# Patient Record
Sex: FEMALE | Race: BLACK OR AFRICAN AMERICAN | Employment: UNEMPLOYED | URBAN - METROPOLITAN AREA
[De-identification: names, ages, dates, MRNs, and addresses within clinical notes are randomized per-mention and may not be internally consistent; named-entity substitution may affect disease eponyms.]

---

## 2020-07-03 PROBLEM — Z34.90 PREGNANCY: Status: ACTIVE | Noted: 2020-07-03

## 2022-03-19 PROBLEM — Z34.90 PREGNANCY: Status: ACTIVE | Noted: 2020-07-03

## 2022-08-05 LAB
ANTIBODY SCREEN, EXTERNAL: NORMAL
CHLAMYDIA, EXTERNAL: NORMAL
HBSAG, EXTERNAL: NORMAL
HIV, EXTERNAL: NORMAL
N. GONORRHEA, EXTERNAL: NORMAL
RPR, EXTERNAL: NORMAL
RUBELLA, EXTERNAL: NORMAL

## 2022-12-14 ENCOUNTER — HOSPITAL ENCOUNTER (INPATIENT)
Age: 22
LOS: 1 days | Discharge: HOME | End: 2022-12-15
Attending: OBSTETRICS & GYNECOLOGY | Admitting: STUDENT IN AN ORGANIZED HEALTH CARE EDUCATION/TRAINING PROGRAM
Payer: MEDICAID

## 2022-12-14 PROBLEM — O34.29 MATERNAL CARE DUE TO UTERINE SCAR FROM OTHER PREVIOUS SURGERY: Status: ACTIVE | Noted: 2022-12-14

## 2022-12-14 PROBLEM — O47.03 PRETERM UTERINE CONTRACTIONS IN THIRD TRIMESTER, ANTEPARTUM: Status: ACTIVE | Noted: 2022-12-14

## 2022-12-14 PROBLEM — Z3A.33 33 WEEKS GESTATION OF PREGNANCY: Status: ACTIVE | Noted: 2022-12-14

## 2022-12-14 LAB
APPEARANCE UR: CLEAR
BACTERIA URNS QL MICRO: NEGATIVE /HPF
BASOPHILS # BLD: 0 K/UL (ref 0–0.1)
BASOPHILS NFR BLD: 0 % (ref 0–2)
BILIRUB UR QL: NEGATIVE
COLOR UR: YELLOW
DIFFERENTIAL METHOD BLD: ABNORMAL
EOSINOPHIL # BLD: 0 K/UL (ref 0–0.4)
EOSINOPHIL NFR BLD: 0 % (ref 0–5)
EPITH CASTS URNS QL MICRO: NORMAL /LPF (ref 0–5)
ERYTHROCYTE [DISTWIDTH] IN BLOOD BY AUTOMATED COUNT: 13.3 % (ref 11.6–14.5)
GLUCOSE UR STRIP.AUTO-MCNC: NEGATIVE MG/DL
HCT VFR BLD AUTO: 24.8 % (ref 35–45)
HGB BLD-MCNC: 8.4 G/DL (ref 12–16)
HGB UR QL STRIP: NEGATIVE
IMM GRANULOCYTES # BLD AUTO: 0.1 K/UL (ref 0–0.04)
IMM GRANULOCYTES NFR BLD AUTO: 1 % (ref 0–0.5)
KETONES UR QL STRIP.AUTO: 40 MG/DL
LEUKOCYTE ESTERASE UR QL STRIP.AUTO: ABNORMAL
LYMPHOCYTES # BLD: 2 K/UL (ref 0.9–3.6)
LYMPHOCYTES NFR BLD: 17 % (ref 21–52)
MCH RBC QN AUTO: 30.3 PG (ref 24–34)
MCHC RBC AUTO-ENTMCNC: 33.9 G/DL (ref 31–37)
MCV RBC AUTO: 89.5 FL (ref 78–100)
MONOCYTES # BLD: 1 K/UL (ref 0.05–1.2)
MONOCYTES NFR BLD: 8 % (ref 3–10)
NEUTS SEG # BLD: 8.9 K/UL (ref 1.8–8)
NEUTS SEG NFR BLD: 74 % (ref 40–73)
NITRITE UR QL STRIP.AUTO: NEGATIVE
NRBC # BLD: 0 K/UL (ref 0–0.01)
NRBC BLD-RTO: 0 PER 100 WBC
PH UR STRIP: 7.5 [PH] (ref 5–8)
PLATELET # BLD AUTO: 255 K/UL (ref 135–420)
PMV BLD AUTO: 9.2 FL (ref 9.2–11.8)
PROT UR STRIP-MCNC: NEGATIVE MG/DL
RBC # BLD AUTO: 2.77 M/UL (ref 4.2–5.3)
RBC #/AREA URNS HPF: NEGATIVE /HPF (ref 0–5)
SP GR UR REFRACTOMETRY: 1.01 (ref 1–1.03)
UROBILINOGEN UR QL STRIP.AUTO: 0.2 EU/DL (ref 0.2–1)
WBC # BLD AUTO: 12 K/UL (ref 4.6–13.2)
WBC URNS QL MICRO: NORMAL /HPF (ref 0–5)

## 2022-12-14 PROCEDURE — 85025 COMPLETE CBC W/AUTO DIFF WBC: CPT

## 2022-12-14 PROCEDURE — 75410000002 HC LABOR FEE PER 1 HR

## 2022-12-14 PROCEDURE — 74011250636 HC RX REV CODE- 250/636

## 2022-12-14 PROCEDURE — 86900 BLOOD TYPING SEROLOGIC ABO: CPT

## 2022-12-14 PROCEDURE — 86901 BLOOD TYPING SEROLOGIC RH(D): CPT

## 2022-12-14 PROCEDURE — 74011250636 HC RX REV CODE- 250/636: Performed by: ADVANCED PRACTICE MIDWIFE

## 2022-12-14 PROCEDURE — 87147 CULTURE TYPE IMMUNOLOGIC: CPT

## 2022-12-14 PROCEDURE — 81001 URINALYSIS AUTO W/SCOPE: CPT

## 2022-12-14 PROCEDURE — 74011250637 HC RX REV CODE- 250/637

## 2022-12-14 PROCEDURE — 65270000029 HC RM PRIVATE

## 2022-12-14 PROCEDURE — 87081 CULTURE SCREEN ONLY: CPT

## 2022-12-14 PROCEDURE — 86850 RBC ANTIBODY SCREEN: CPT

## 2022-12-14 PROCEDURE — 87491 CHLMYD TRACH DNA AMP PROBE: CPT

## 2022-12-14 PROCEDURE — 4A1HXCZ MONITORING OF PRODUCTS OF CONCEPTION, CARDIAC RATE, EXTERNAL APPROACH: ICD-10-PCS | Performed by: STUDENT IN AN ORGANIZED HEALTH CARE EDUCATION/TRAINING PROGRAM

## 2022-12-14 RX ORDER — NIFEDIPINE 10 MG/1
10 CAPSULE ORAL EVERY 4 HOURS
Status: DISCONTINUED | OUTPATIENT
Start: 2022-12-15 | End: 2022-12-15 | Stop reason: HOSPADM

## 2022-12-14 RX ORDER — ACETAMINOPHEN 500 MG
1000 TABLET ORAL
Status: DISCONTINUED | OUTPATIENT
Start: 2022-12-14 | End: 2022-12-15 | Stop reason: HOSPADM

## 2022-12-14 RX ORDER — LANOLIN ALCOHOL/MO/W.PET/CERES
CREAM (GRAM) TOPICAL
COMMUNITY

## 2022-12-14 RX ORDER — NIFEDIPINE 10 MG/1
10 CAPSULE ORAL EVERY 4 HOURS
Status: DISCONTINUED | OUTPATIENT
Start: 2022-12-15 | End: 2022-12-14

## 2022-12-14 RX ORDER — BETAMETHASONE SODIUM PHOSPHATE AND BETAMETHASONE ACETATE 3; 3 MG/ML; MG/ML
12 INJECTION, SUSPENSION INTRA-ARTICULAR; INTRALESIONAL; INTRAMUSCULAR; SOFT TISSUE EVERY 24 HOURS
Status: COMPLETED | OUTPATIENT
Start: 2022-12-14 | End: 2022-12-15

## 2022-12-14 RX ORDER — NIFEDIPINE 10 MG/1
30 CAPSULE ORAL ONCE
Status: COMPLETED | OUTPATIENT
Start: 2022-12-14 | End: 2022-12-14

## 2022-12-14 RX ORDER — ONDANSETRON 2 MG/ML
4 INJECTION INTRAMUSCULAR; INTRAVENOUS
Status: DISCONTINUED | OUTPATIENT
Start: 2022-12-14 | End: 2022-12-15 | Stop reason: HOSPADM

## 2022-12-14 RX ORDER — SODIUM CHLORIDE 0.9 % (FLUSH) 0.9 %
5-40 SYRINGE (ML) INJECTION EVERY 8 HOURS
Status: DISCONTINUED | OUTPATIENT
Start: 2022-12-14 | End: 2022-12-15 | Stop reason: HOSPADM

## 2022-12-14 RX ORDER — OXYTOCIN/RINGER'S LACTATE 30/500 ML
10 PLASTIC BAG, INJECTION (ML) INTRAVENOUS AS NEEDED
Status: DISCONTINUED | OUTPATIENT
Start: 2022-12-14 | End: 2022-12-15 | Stop reason: HOSPADM

## 2022-12-14 RX ORDER — SODIUM CHLORIDE 0.9 % (FLUSH) 0.9 %
5-40 SYRINGE (ML) INJECTION AS NEEDED
Status: DISCONTINUED | OUTPATIENT
Start: 2022-12-14 | End: 2022-12-15 | Stop reason: HOSPADM

## 2022-12-14 RX ORDER — SODIUM CHLORIDE, SODIUM LACTATE, POTASSIUM CHLORIDE, CALCIUM CHLORIDE 600; 310; 30; 20 MG/100ML; MG/100ML; MG/100ML; MG/100ML
125 INJECTION, SOLUTION INTRAVENOUS CONTINUOUS
Status: DISCONTINUED | OUTPATIENT
Start: 2022-12-14 | End: 2022-12-15 | Stop reason: HOSPADM

## 2022-12-14 RX ORDER — OXYTOCIN/0.9 % SODIUM CHLORIDE 30/500 ML
87.3 PLASTIC BAG, INJECTION (ML) INTRAVENOUS AS NEEDED
Status: DISCONTINUED | OUTPATIENT
Start: 2022-12-14 | End: 2022-12-15 | Stop reason: HOSPADM

## 2022-12-14 RX ORDER — BUTORPHANOL TARTRATE 2 MG/ML
2 INJECTION INTRAMUSCULAR; INTRAVENOUS
Status: DISCONTINUED | OUTPATIENT
Start: 2022-12-14 | End: 2022-12-15 | Stop reason: HOSPADM

## 2022-12-14 RX ADMIN — NIFEDIPINE 30 MG: 10 CAPSULE ORAL at 20:49

## 2022-12-14 RX ADMIN — SODIUM CHLORIDE, POTASSIUM CHLORIDE, SODIUM LACTATE AND CALCIUM CHLORIDE 125 ML/HR: 600; 310; 30; 20 INJECTION, SOLUTION INTRAVENOUS at 22:18

## 2022-12-14 RX ADMIN — ACETAMINOPHEN 1000 MG: 500 TABLET ORAL at 22:00

## 2022-12-14 RX ADMIN — BETAMETHASONE SODIUM PHOSPHATE AND BETAMETHASONE ACETATE 12 MG: 3; 3 INJECTION, SUSPENSION INTRA-ARTICULAR; INTRALESIONAL; INTRAMUSCULAR at 19:38

## 2022-12-15 VITALS
BODY MASS INDEX: 29.02 KG/M2 | DIASTOLIC BLOOD PRESSURE: 79 MMHG | WEIGHT: 170 LBS | RESPIRATION RATE: 20 BRPM | OXYGEN SATURATION: 100 % | HEART RATE: 114 BPM | TEMPERATURE: 98.4 F | HEIGHT: 64 IN | SYSTOLIC BLOOD PRESSURE: 128 MMHG

## 2022-12-15 LAB
C TRACH RRNA SPEC QL NAA+PROBE: NEGATIVE
N GONORRHOEA RRNA SPEC QL NAA+PROBE: NEGATIVE
SPECIMEN SOURCE: NORMAL

## 2022-12-15 PROCEDURE — 99284 EMERGENCY DEPT VISIT MOD MDM: CPT

## 2022-12-15 PROCEDURE — 74011250636 HC RX REV CODE- 250/636

## 2022-12-15 PROCEDURE — 74011250636 HC RX REV CODE- 250/636: Performed by: ADVANCED PRACTICE MIDWIFE

## 2022-12-15 PROCEDURE — 96360 HYDRATION IV INFUSION INIT: CPT

## 2022-12-15 PROCEDURE — 96372 THER/PROPH/DIAG INJ SC/IM: CPT

## 2022-12-15 PROCEDURE — 74011250637 HC RX REV CODE- 250/637

## 2022-12-15 PROCEDURE — 59020 FETAL CONTRACT STRESS TEST: CPT

## 2022-12-15 RX ORDER — DIPHENHYDRAMINE HCL 25 MG
25 CAPSULE ORAL
Status: DISCONTINUED | OUTPATIENT
Start: 2022-12-15 | End: 2022-12-15 | Stop reason: HOSPADM

## 2022-12-15 RX ORDER — NIFEDIPINE 10 MG/1
10 CAPSULE ORAL EVERY 4 HOURS
Qty: 6 CAPSULE | Refills: 0 | Status: SHIPPED | OUTPATIENT
Start: 2022-12-15 | End: 2022-12-17

## 2022-12-15 RX ORDER — ZOLPIDEM TARTRATE 5 MG/1
5 TABLET ORAL
Status: DISCONTINUED | OUTPATIENT
Start: 2022-12-15 | End: 2022-12-15 | Stop reason: HOSPADM

## 2022-12-15 RX ADMIN — SODIUM CHLORIDE, POTASSIUM CHLORIDE, SODIUM LACTATE AND CALCIUM CHLORIDE 125 ML/HR: 600; 310; 30; 20 INJECTION, SOLUTION INTRAVENOUS at 13:35

## 2022-12-15 RX ADMIN — SODIUM CHLORIDE, POTASSIUM CHLORIDE, SODIUM LACTATE AND CALCIUM CHLORIDE 125 ML/HR: 600; 310; 30; 20 INJECTION, SOLUTION INTRAVENOUS at 05:32

## 2022-12-15 RX ADMIN — NIFEDIPINE 10 MG: 10 CAPSULE ORAL at 12:49

## 2022-12-15 RX ADMIN — NIFEDIPINE 10 MG: 10 CAPSULE ORAL at 05:03

## 2022-12-15 RX ADMIN — NIFEDIPINE 10 MG: 10 CAPSULE ORAL at 16:56

## 2022-12-15 RX ADMIN — BETAMETHASONE SODIUM PHOSPHATE AND BETAMETHASONE ACETATE 12 MG: 3; 3 INJECTION, SUSPENSION INTRA-ARTICULAR; INTRALESIONAL; INTRAMUSCULAR at 19:44

## 2022-12-15 RX ADMIN — ZOLPIDEM TARTRATE 5 MG: 5 TABLET ORAL at 02:17

## 2022-12-15 RX ADMIN — HUMAN RHO(D) IMMUNE GLOBULIN 0.3 MG: 300 INJECTION, SOLUTION INTRAMUSCULAR at 14:51

## 2022-12-15 RX ADMIN — NIFEDIPINE 10 MG: 10 CAPSULE ORAL at 01:02

## 2022-12-15 RX ADMIN — NIFEDIPINE 10 MG: 10 CAPSULE ORAL at 09:06

## 2022-12-15 NOTE — PROGRESS NOTES
Problem: Pain  Goal: *Control of Pain  Outcome: Progressing Towards Goal     Problem: Patient Education: Go to Patient Education Activity  Goal: Patient/Family Education  Outcome: Progressing Towards Goal     Problem:  Labor  Goal: *Prevention/Cessation of labor signs/symptoms  Outcome: Progressing Towards Goal  Goal: *Reassuring fetal surveillance  Outcome: Progressing Towards Goal     Problem: Patient Education: Go to Patient Education Activity  Goal: Patient/Family Education  Outcome: Progressing Towards Goal

## 2022-12-15 NOTE — PROGRESS NOTES
1915- Care of pt assumed by this RN    2017- CNM at bedside discussing TOLAC vs. c/s delivery w/ pt    2019- SVE by MILADY France CNM: no change    2020- IV i'locked per CNM. Plan to continue to observe and give procardia per CNM. 2157- EFM tracing & maternal tachycardia reviewed by Dr. Madhuri Hendrickson. JONNIE France; plan to restart IV fluids. 46- MILADY Wolf CNM updated on pt status & VS including maternal tachycardia. No new orders at this time. RN still to give scheduled dose of procardia per CNM.      3224- EFM tracing reviewed by JONNIE    0671- Shift change report given to Sugey Jackson RN assuming care of pt.

## 2022-12-15 NOTE — H&P
History & Physical    Name: Kristina Zapata MRN: [id number]  SSN: [id number]    YOB: 2000  Age: 25 y.o. Sex: female        Subjective:     Estimated Date of Delivery: 23  OB History    Para Term  AB Living   3 2 2     2   SAB IAB Ectopic Molar Multiple Live Births           0 2      # Outcome Date GA Lbr Ulices/2nd Weight Sex Delivery Anes PTL Lv   3 Current            2 Term 20 40w2d  3.56 kg M CS-LTranv Spinal N TARA   1 Term     F CS-LTranv   TARA       Ms. Ann Garcia is admitted with pregnancy at 33w0d for Increasingly painful contractions. Patient presented to [facility], [place], operated by [facility] ED this afternoon with reports of abdominal pain, contractions and discharge since Monday that increased in intensity today. Was transferred to [facility] via EMS for suspected  labor. Prenatal course was complicated by  prior  section x 2 and anemia  . Please see prenatal records for details. Past Medical History:   Diagnosis Date    Anemia      Past Surgical History:   Procedure Laterality Date    HX  SECTION       Social History     Occupational History    Not on file   Tobacco Use    Smoking status: Never    Smokeless tobacco: Never   Substance and Sexual Activity    Alcohol use: No    Drug use: No    Sexual activity: Not on file     No family history on file. No Known Allergies  Prior to Admission medications    Medication Sig Start Date End Date Taking? Authorizing Provider   ibuprofen (MOTRIN) 600 mg tablet Take 1 Tab by mouth every six (6) hours as needed for Pain. 20   Taryn Stearns MD   PNV No12-Iron-FA-DSS-OM-3 29 mg iron-1 mg -50 mg CPKD Take 1 Tab by mouth. Provider, Historical        Review of Systems: A comprehensive review of systems was negative except for that written in the HPI.     Objective:     Vitals:  Vitals:    22 1907   BP: 124/78   Pulse: 98   Resp: 16   Temp: 98.7 °F (37.1 °C)        Physical Exam:  Perineum: blood present, amniotic fluid absent  Cervical Exam: 4 cm dilated    80% effaced    -2 station    Presenting Part: cephalic  Cervical Position: mid position  Membranes:  Intact  Fetal Heart Rate: Baseline: 140 per minute  Variability: moderate  Accelerations: yes  Decelerations: none  Uterine contractions: regular, every 3-4 minutes    Prenatal Labs:   Lab Results   Component Value Date/Time    ABO/Rh(D) B Rh Negative 2020 02:33 PM    Rubella, External immune 2022 12:00 AM    GrBStrep, External negative 2020 12:00 AM    HBsAg, External neg 2022 12:00 AM    HIV, External neg 2022 12:00 AM    RPR, External NR 2022 12:00 AM    Gonorrhea, External neg 2022 12:00 AM    Chlamydia, External neg 2022 12:00 AM    ABO,Rh B+ 2019 12:00 AM         Assessment/Plan:     Active Problems:    33 weeks gestation of pregnancy (2022)      Maternal care due to uterine scar from other previous surgery (2022)       uterine contractions in third trimester, antepartum (2022)         Plan: Admit for  contractions at 33 weeks with hx of prior  section x2. Plan for ANCS x2 doses, IV hydration and reassess cervical dilation in 1 hour. Dr. Annalisa Hong aware of admission and POC. .  Group B Strep was tested on admission. Jessica Tejada

## 2022-12-15 NOTE — PROGRESS NOTES
Ante Partum High Risk Pregnancy Note      Patient: George Uribe               Sex: female          DOA: [unfilled]       YOB: 2000      Age:  25 y.o.        LOS:  LOS: 1 day     Patient admitted for   contractions  states she does have normal fetal movement and does not  have  right upper quadrant pain  , vaginal bleeding , and vaginal leaking of fluid . She does report occasional uterine cramping. LOS:  1  Vitals: Temp (24hrs), Av.6 °F (37 °C), Min:97.8 °F (36.6 °C), Max:99.4 °F (37.4 °C)   Patient Vitals for the past 24 hrs:   BP   12/15/22 1455 124/70   12/15/22 1432 117/66   12/15/22 1300 118/81   12/15/22 1249 126/77   12/15/22 1200 126/77   12/15/22 1101 123/77   12/15/22 1000 114/69   12/15/22 0906 103/75   12/15/22 0800 116/82   12/15/22 0726 118/86   12/15/22 0724 118/86   12/15/22 0700 116/67   12/15/22 0600 (!) 105/58   12/15/22 0500 106/67   12/15/22 0445 113/72   12/15/22 0400 (!) 93/58   12/15/22 0300 114/70   12/15/22 0252 110/61   12/15/22 0100 118/68   12/15/22 0030 123/75   12/15/22 0000 120/81   22 2330 118/78   22 2300 112/63   22 2230 (!) 103/55   22 2200 (!) 106/51   22 2130 114/62   22 2100 135/78   22 2031 138/80   22 127/75   22 126/73   22 1907 124/78       I&O:   No intake/output data recorded. No intake/output data recorded. Exam:  Patient without distress. Abdomen: soft, non-tender               Fundus: soft and non tender               Fetal Monitoring:  Baseline: 150 bpm, Variability: moderate, Accelerations: +, and Decelerations: Absent   Uterine Activity: irregular contractions lasting 50-80 seconds    Dilation: deferred       Labs: All lab results for the last 24 hours reviewed. Assessment and Plan:       Active Problems:    33 weeks gestation of pregnancy (2022)      Maternal care due to uterine scar from other previous surgery (2022)  uterine contractions in third trimester, antepartum (2022)    Procardia 10mg q 4 hours for 48 hours   corticosteroids x2 doses  Rhogam given for RH negative    Plan for discharge home this evening after her 2nd dose of steroids; will DC with 6 doses of procardia to complete the 48 hours needed to complete the course of steroids. Patient to return with worsening contractions, ROM, bleeding. Patient to keep scheduled [facility] visit. Discussed plan with Dr. Jessica Lyles.     Kedar Patel CNM

## 2022-12-15 NOTE — PROGRESS NOTES
Problem: Pain  Goal: *Control of Pain  Outcome: Progressing Towards Goal     Problem: Patient Education: Go to Patient Education Activity  Goal: Patient/Family Education  Outcome: Progressing Towards Goal     Problem:  Labor  Goal: *Prevention/Cessation of labor signs/symptoms  Outcome: Progressing Towards Goal  Goal: *Reassuring fetal surveillance  Outcome: Progressing Towards Goal     Problem:  Labor  Goal: *Reassuring fetal surveillance  Outcome: Progressing Towards Goal     Problem: Patient Education: Go to Patient Education Activity  Goal: Patient/Family Education  Outcome: Progressing Towards Goal

## 2022-12-15 NOTE — DISCHARGE SUMMARY
Obstetrical Discharge Summary     Name: Hever Enciso MRN: [id number]  SSN: [id number]    YOB: 2000  Age: 25 y.o. Sex: female      Admit Date: 2022    Discharge Date: 12/15/2022     Admitting Physician: Dwight Rascon MD     Attending Physician:  Matilde Borden MD     Admission Diagnoses: Maternal care due to uterine scar from other previous surgery [O34.29]   uterine contractions in third trimester, antepartum [O47.03]  33 weeks gestation of pregnancy [Z3A.33]    Discharge Diagnoses:    uterine contractions in third trimester, antepartum [O47.03]    Additional Diagnoses:   Hospital Problems  Date Reviewed: 9/15/2014            Codes Class Noted POA    33 weeks gestation of pregnancy ICD-10-CM: Z3A.33  ICD-9-CM: V22.2  2022 Unknown        Maternal care due to uterine scar from other previous surgery ICD-10-CM: O34.29  ICD-9-CM: 654.90  2022 Unknown         uterine contractions in third trimester, antepartum ICD-10-CM: O47.03  ICD-9-CM: 644.03  2022 Unknown          Lab Results   Component Value Date/Time    Rubella, External immune 2022 12:00 AM    GrBStrep, External negative 2020 12:00 AM       Immunization(s):   Immunization History   Administered Date(s) Administered    Rho(D) Immune Globulin - IM 2020, 12/15/2022        Hospital Course: uncomplicated    Patient Instructions:   Current Discharge Medication List        START taking these medications    Details   NIFEdipine (PROCARDIA) 10 mg capsule Take 1 Capsule by mouth every four (4) hours for 7 doses. Qty: 6 Capsule, Refills: 0           CONTINUE these medications which have NOT CHANGED    Details   ferrous sulfate (Iron) 325 mg (65 mg iron) tablet Take  by mouth Daily (before breakfast). PNV No12-Iron-FA-DSS-OM-3 29 mg iron-1 mg -50 mg CPKD Take 1 Tab by mouth.            STOP taking these medications       ibuprofen (MOTRIN) 600 mg tablet Comments:   Reason for Stopping: Reference my discharge instructions. Follow-up Appointments   Procedures    FOLLOW UP VISIT Appointment in: Other (Specify) Keep scheduled appt tomorrow. Keep scheduled appt tomorrow. Standing Status:   Standing     Number of Occurrences:   1     Order Specific Question:   Appointment in     Answer:    Other (Specify)        Signed By:  Meir Fuentse CNM     December 15, 2022

## 2022-12-15 NOTE — PROGRESS NOTES
0715-Bedside and Verbal shift change report given to Kojo Delong RN and KELLIE Casarez   (oncoming nurse) by Miles Lagunas RN (offgoing nurse). Report included the following information SBAR, Procedure Summary, Intake/Output, MAR, and Recent Results. 3133- verbally reported to MILADY France CNM  to patient's MEWS score of 1, due to tachycardia.

## 2022-12-15 NOTE — PROGRESS NOTES
33 weeks gestation sent from [facility] via EMS for rule out labor with cervical exam 4 and high per PA. Pt taken to L&D room 7, oriented to room, placed on FHM, call bell in reach. SVE /ballotable by ROBBIE Daigle CNM.  Bedside and Verbal shift change report given to Eddie Rincon RN (oncoming nurse) by Nasreen Arita RN   (offgoing nurse). Report included the following information SBAR, Intake/Output, MAR, Recent Results, and Med Rec Status.

## 2022-12-15 NOTE — PROGRESS NOTES
Ante Partum Progress Note    Stacy Modi  33w0d    Assessment: 33w0d   Pre-term labor, arrested  /-2; Cephalic presentation  Membranes intact     Plan:  Continue hospitalization with hospitalized bedrest, Complete  steroid course, and Continued tocolysis with nifedipine. Discussed plan for possible TOLAC vs Rpt CS if actively laboring, pt to discuss with partner and advise. Reviewed risks/benefits of TOLAC vs CS. Patient states she does have normal fetal movement and does not have headache , right upper quadrant pain  , vaginal bleeding , swelling, and vaginal leaking of fluid ; Denies contractions being any more uncomfortable than they have been. Vitals:  Visit Vitals  /75 (BP 1 Location: Right upper arm, BP Patient Position: Sitting)   Pulse 89   Temp 98.6 °F (37 °C)   Resp 20   Ht 5' 4\" (1.626 m)   Wt 77.1 kg (170 lb)   SpO2 100%   BMI 29.18 kg/m²     Temp (24hrs), Av.7 °F (37.1 °C), Min:98.6 °F (37 °C), Max:98.7 °F (37.1 °C)      Last 24hr Input/Output:  No intake or output data in the 24 hours ending 22     Non stress test:  Reactive    Uterine Activity: Frequency: Every 3-7 minutes; mild on palpation    FHR:  148 baseline, moderate variability, + accels, no decels  Cat I FHR tracing     Exam:  Patient without distress.      Abdomen, fundus soft non-tender     Extremities, no redness or tenderness             CERVIX;  /-9 / Cephalic on exam     Additional Exam: Deferred    Labs:     Lab Results   Component Value Date/Time    WBC 12.0 2022 07:37 PM    WBC 17.6 (H) 2020 02:33 PM    WBC 11.7 (H) 2020 05:24 AM    HGB 8.4 (L) 2022 07:37 PM    HGB 10.5 (L) 2020 02:33 PM    HGB 8.8 (L) 2020 05:24 AM    HCT 24.8 (L) 2022 07:37 PM    HCT 31.9 (L) 2020 02:33 PM    HCT 27.2 (L) 2020 05:24 AM    PLATELET 311  07:37 PM    PLATELET 985  02:33 PM    PLATELET 532  05:24 AM       Recent Results (from the past 24 hour(s))   CBC WITH AUTOMATED DIFF    Collection Time: 12/14/22  7:37 PM   Result Value Ref Range    WBC 12.0 4.6 - 13.2 K/uL    RBC 2.77 (L) 4.20 - 5.30 M/uL    HGB 8.4 (L) 12.0 - 16.0 g/dL    HCT 24.8 (L) 35.0 - 45.0 %    MCV 89.5 78.0 - 100.0 FL    MCH 30.3 24.0 - 34.0 PG    MCHC 33.9 31.0 - 37.0 g/dL    RDW 13.3 11.6 - 14.5 %    PLATELET 863 165 - 755 K/uL    MPV 9.2 9.2 - 11.8 FL    NRBC 0.0 0  WBC    ABSOLUTE NRBC 0.00 0.00 - 0.01 K/uL    NEUTROPHILS 74 (H) 40 - 73 %    LYMPHOCYTES 17 (L) 21 - 52 %    MONOCYTES 8 3 - 10 %    EOSINOPHILS 0 0 - 5 %    BASOPHILS 0 0 - 2 %    IMMATURE GRANULOCYTES 1 (H) 0.0 - 0.5 %    ABS. NEUTROPHILS 8.9 (H) 1.8 - 8.0 K/UL    ABS. LYMPHOCYTES 2.0 0.9 - 3.6 K/UL    ABS. MONOCYTES 1.0 0.05 - 1.2 K/UL    ABS. EOSINOPHILS 0.0 0.0 - 0.4 K/UL    ABS. BASOPHILS 0.0 0.0 - 0.1 K/UL    ABS. IMM.  GRANS. 0.1 (H) 0.00 - 0.04 K/UL    DF AUTOMATED     TYPE & SCREEN    Collection Time: 12/14/22  7:37 PM   Result Value Ref Range    Crossmatch Expiration 12/17/2022,2359     ABO/Rh(D) PENDING     Antibody screen PENDING        Sánchez Rodriguez CNM

## 2022-12-16 LAB
ABO + RH BLD: NORMAL
BACTERIA SPEC CULT: ABNORMAL
BLD PROD TYP BPU: NORMAL
BLOOD BANK CMNT PATIENT-IMP: NORMAL
BLOOD GROUP ANTIBODIES SERPL: NORMAL
BPU ID: NORMAL
CALLED TO:,BCALL1: NORMAL
CALLED TO:,BCALL1: NORMAL
GA (WEEKS): NORMAL WK
SERVICE CMNT-IMP: ABNORMAL
SPECIMEN EXP DATE BLD: NORMAL
STATUS OF UNIT,%ST: NORMAL
UNIT DIVISION, %UDIV: 0

## 2022-12-16 NOTE — PROGRESS NOTES
1944 2nd dose of Celestone given. Written and verbal DC instructions given to include daily fetal kick counts,  labor precautions. Verbalizes understanding. All questions answered. Up to get dressed.  DC'd to home ambulatory with significant other.

## 2022-12-16 NOTE — DISCHARGE INSTRUCTIONS
Counting Your Baby's Kicks: Care Instructions  Overview     Counting your baby's kicks is one way your doctor can tell that your baby is healthy. Most women--especially in a first pregnancy--feel their baby move for the first time between 16 and 22 weeks. The movement may feel like flutters rather than kicks. Your baby may move more at certain times of the day. When you are active, you may notice less kicking than when you are resting. At your prenatal visits, your doctor will ask whether the baby is active. In your last trimester, your doctor may ask you to count the number of times you feel your baby move. Follow-up care is a key part of your treatment and safety. Be sure to make and go to all appointments, and call your doctor if you are having problems. It's also a good idea to know your test results and keep a list of the medicines you take. How do you count fetal kicks? A common method of checking your baby's movement is to note the length of time it takes to count ten movements (such as kicks, flutters, or rolls). Pick your baby's most active time of day to count. This may be any time from morning to evening. If you don't feel 10 movements in an hour, have something to eat or drink and count for another hour. If you don't feel at least 10 movements in the 2-hour period, call your doctor. When should you call for help? Call your doctor now or seek immediate medical care if:    You noticed that your baby has stopped moving or is moving much less than normal.   Watch closely for changes in your health, and be sure to contact your doctor if you have any problems. Where can you learn more? Go to http://www.gray.com/  Enter [id number] in the search box to learn more about \"Counting Your Baby's Kicks: Care Instructions. \"  Current as of: February 23, 2022               Content Version: 13.4  © 6597-7484 Healthwise, [employer].    Care instructions adapted under license by [employer] (which disclaims liability or warranty for this information). If you have questions about a medical condition or this instruction, always ask your healthcare professional. [place] [address] any warranty or liability for your use of this information.  Labor: Care Instructions  Overview      labor is the start of labor between 21 and 36 weeks of pregnancy. Most babies are born at 40 to 41 weeks of pregnancy. In labor, the uterus contracts to open the cervix. This is the first stage of childbirth.  labor can be caused by a problem with the baby, the mother, or both. Often the cause is not known. In some cases, doctors use medicines to try to delay labor until 29 or more weeks of pregnancy. By this time, a baby has grown enough so that problems are not likely. In some cases--such as with a serious infection--it is healthier for the baby to be born early. Your treatment will depend on how far along you are in your pregnancy and on your health and your baby's health. Follow-up care is a key part of your treatment and safety. Be sure to make and go to all appointments, and call your doctor if you are having problems. It's also a good idea to know your test results and keep a list of the medicines you take. How can you care for yourself at home? If your doctor prescribed medicines, take them exactly as directed. Call your doctor if you think you are having a problem with your medicine. Rest until your doctor advises you about activity. Do not have sexual intercourse unless your doctor says it is safe. Use sanitary pads if you have vaginal bleeding. Using pads makes it easier to monitor your bleeding. Do not smoke or allow others to smoke around you. If you need help quitting, talk to your doctor about stop-smoking programs and medicines. These can increase your chances of quitting for good. When should you call for help?    Call 911  anytime you think you may need emergency care. For example, call if:    You passed out (lost consciousness). You have a seizure. You have severe vaginal bleeding. You have severe pain in your belly or pelvis that doesn't get better between contractions. You have had fluid gushing or leaking from your vagina and you know or think the umbilical cord is bulging into your vagina. If this happens, immediately get down on your knees so your rear end (buttocks) is higher than your head. This will decrease the pressure on the cord until help arrives. Call your doctor now or seek immediate medical care if:    You have signs of preeclampsia, such as:  Sudden swelling of your face, hands, or feet. New vision problems (such as dimness, blurring, or seeing spots). A severe headache. You have any vaginal bleeding. You have belly pain or cramping. You have a fever. You have had regular contractions (with or without pain) for an hour. This means that you have 6 or more within 1 hour after you change your position and drink fluids. You have a sudden release of fluid from the vagina. You have low back pain or pelvic pressure that does not go away. You notice that your baby has stopped moving or is moving much less than normal.   Watch closely for changes in your health, and be sure to contact your doctor if you have any problems. Where can you learn more? Go to http://www.gray.com/  Enter Q400 in the search box to learn more about \" Labor: Care Instructions. \"  Current as of: 2022               Content Version: 13.4   Healthwise, [employer]. Care instructions adapted under license by [employer] (which disclaims liability or warranty for this information).  If you have questions about a medical condition or this instruction, always ask your healthcare professional. [place] [address] any warranty or liability for your use of this information.

## 2022-12-16 NOTE — ROUTINE PROCESS
1910 Bedside and Verbal shift change report given to PERLA Greenberg Sa, RN  (oncoming nurse) by KELLIE Ye RN and Elle Loya RN  (offgoing nurse). Report included the following information SBAR, Kardex, Procedure Summary, Intake/Output, MAR, Accordion, Recent Results, and Med Rec Status.

## 2023-01-01 ENCOUNTER — HOSPITAL ENCOUNTER (EMERGENCY)
Age: 23
Discharge: HOME | End: 2023-01-02
Attending: OBSTETRICS & GYNECOLOGY | Admitting: OBSTETRICS & GYNECOLOGY
Payer: MEDICAID

## 2023-01-01 PROCEDURE — 59025 FETAL NON-STRESS TEST: CPT

## 2023-01-01 PROCEDURE — 65270000029 HC RM PRIVATE

## 2023-01-02 ENCOUNTER — HOSPITAL ENCOUNTER (INPATIENT)
Age: 23
LOS: 2 days | Discharge: HOME | End: 2023-01-05
Attending: OBSTETRICS & GYNECOLOGY | Admitting: OBSTETRICS & GYNECOLOGY
Payer: MEDICAID

## 2023-01-02 VITALS
BODY MASS INDEX: 29.02 KG/M2 | WEIGHT: 170 LBS | TEMPERATURE: 99.4 F | HEART RATE: 84 BPM | RESPIRATION RATE: 18 BRPM | DIASTOLIC BLOOD PRESSURE: 80 MMHG | OXYGEN SATURATION: 100 % | HEIGHT: 64 IN | SYSTOLIC BLOOD PRESSURE: 128 MMHG

## 2023-01-02 PROCEDURE — 74011250636 HC RX REV CODE- 250/636: Performed by: ADVANCED PRACTICE MIDWIFE

## 2023-01-02 PROCEDURE — 74011000250 HC RX REV CODE- 250: Performed by: ADVANCED PRACTICE MIDWIFE

## 2023-01-02 PROCEDURE — 81003 URINALYSIS AUTO W/O SCOPE: CPT

## 2023-01-02 PROCEDURE — 96360 HYDRATION IV INFUSION INIT: CPT

## 2023-01-02 PROCEDURE — 99285 EMERGENCY DEPT VISIT HI MDM: CPT

## 2023-01-02 PROCEDURE — 74011000258 HC RX REV CODE- 258: Performed by: ADVANCED PRACTICE MIDWIFE

## 2023-01-02 PROCEDURE — 59025 FETAL NON-STRESS TEST: CPT

## 2023-01-02 RX ORDER — ACETAMINOPHEN 500 MG
500 TABLET ORAL AS NEEDED
COMMUNITY
End: 2023-01-05

## 2023-01-02 RX ORDER — ONDANSETRON 2 MG/ML
4 INJECTION INTRAMUSCULAR; INTRAVENOUS ONCE
Status: COMPLETED | OUTPATIENT
Start: 2023-01-03 | End: 2023-01-03

## 2023-01-02 RX ADMIN — FAMOTIDINE 20 MG: 10 INJECTION, SOLUTION INTRAVENOUS at 23:56

## 2023-01-02 RX ADMIN — SODIUM CHLORIDE, POTASSIUM CHLORIDE, SODIUM LACTATE AND CALCIUM CHLORIDE 1000 ML: 600; 310; 30; 20 INJECTION, SOLUTION INTRAVENOUS at 01:00

## 2023-01-02 RX ADMIN — PROMETHAZINE HYDROCHLORIDE 12.5 MG: 25 INJECTION, SOLUTION INTRAMUSCULAR; INTRAVENOUS at 23:50

## 2023-01-02 RX ADMIN — SODIUM CHLORIDE, POTASSIUM CHLORIDE, SODIUM LACTATE AND CALCIUM CHLORIDE 1000 ML: 600; 310; 30; 20 INJECTION, SOLUTION INTRAVENOUS at 23:50

## 2023-01-02 NOTE — PROGRESS NOTES
2337- Pt brought to L&D unit c/o ctx. Pt is  and 35w4d. 2345- Pt placed in labor room 1 and oriented to room. Gown provided and Pt instructed to leave urine sample. 0000- Required documentation complete. 0015- SVE /-2.     0030- CNM Williams notified of Pt status and exam. Orders given to hydrate PO and give 1 liter of fluids. 0110- Pt resting. Water provided for oral hydration. 0134- Pt ambulated to restroom w/assistance. Pt stated \"contractions are not as close together but still intense. \"    0300- Pt advised of POC and agrees. Pt advised to follow up w/provider. 0330- I have reviewed discharge instructions with the patient. Instructions included signs of  labor and when to return as well as the importance of maintaining hydration. The patient verbalized understanding. 0331- Pt ambulated off of L&D unit.

## 2023-01-03 ENCOUNTER — ANESTHESIA (OUTPATIENT)
Dept: LABOR AND DELIVERY | Age: 23
End: 2023-01-03
Payer: MEDICAID

## 2023-01-03 ENCOUNTER — ANESTHESIA EVENT (OUTPATIENT)
Dept: LABOR AND DELIVERY | Age: 23
End: 2023-01-03
Payer: MEDICAID

## 2023-01-03 PROBLEM — O60.00 ACTIVE PRETERM LABOR: Status: ACTIVE | Noted: 2023-01-03

## 2023-01-03 PROBLEM — Z98.891 HISTORY OF CESAREAN DELIVERY: Status: ACTIVE | Noted: 2023-01-03

## 2023-01-03 PROBLEM — Z33.1 IUP (INTRAUTERINE PREGNANCY), INCIDENTAL: Status: ACTIVE | Noted: 2023-01-03

## 2023-01-03 PROBLEM — Z3A.35 35 WEEKS GESTATION OF PREGNANCY: Status: ACTIVE | Noted: 2023-01-03

## 2023-01-03 PROBLEM — O47.9 UTERINE CONTRACTIONS: Status: ACTIVE | Noted: 2023-01-03

## 2023-01-03 LAB
ABO + RH BLD: NORMAL
APPEARANCE UR: ABNORMAL
APPEARANCE UR: CLEAR
BASOPHILS # BLD: 0 K/UL (ref 0–0.1)
BASOPHILS NFR BLD: 0 % (ref 0–2)
BILIRUB UR QL: NEGATIVE
BILIRUB UR QL: NEGATIVE
BLOOD BANK CMNT PATIENT-IMP: NORMAL
BLOOD GROUP ANTIBODIES SERPL: NORMAL
BLOOD GROUP ANTIBODIES SERPL: NORMAL
CALLED TO:,BCALL1: NORMAL
CALLED TO:,BCALL2: NORMAL
COLOR UR: ABNORMAL
COLOR UR: YELLOW
DIFFERENTIAL METHOD BLD: ABNORMAL
EOSINOPHIL # BLD: 0 K/UL (ref 0–0.4)
EOSINOPHIL NFR BLD: 0 % (ref 0–5)
ERYTHROCYTE [DISTWIDTH] IN BLOOD BY AUTOMATED COUNT: 13.6 % (ref 11.6–14.5)
GLUCOSE UR QL STRIP.AUTO: NEGATIVE MG/DL
GLUCOSE UR QL STRIP.AUTO: NEGATIVE MG/DL
HCT VFR BLD AUTO: 24.1 % (ref 35–45)
HGB BLD-MCNC: 8.6 G/DL (ref 12–16)
IMM GRANULOCYTES # BLD AUTO: 0.2 K/UL (ref 0–0.04)
IMM GRANULOCYTES NFR BLD AUTO: 1 % (ref 0–0.5)
KETONES UR-MCNC: >160 MG/DL
KETONES UR-MCNC: NEGATIVE MG/DL
LEUKOCYTE ESTERASE UR QL STRIP: ABNORMAL
LEUKOCYTE ESTERASE UR QL STRIP: NEGATIVE
LYMPHOCYTES # BLD: 1.2 K/UL (ref 0.9–3.6)
LYMPHOCYTES NFR BLD: 6 % (ref 21–52)
MCH RBC QN AUTO: 32.5 PG (ref 24–34)
MCHC RBC AUTO-ENTMCNC: 35.7 G/DL (ref 31–37)
MCV RBC AUTO: 90.9 FL (ref 78–100)
MONOCYTES # BLD: 1.8 K/UL (ref 0.05–1.2)
MONOCYTES NFR BLD: 9 % (ref 3–10)
NEUTS SEG # BLD: 17 K/UL (ref 1.8–8)
NEUTS SEG NFR BLD: 84 % (ref 40–73)
NITRITE UR QL: NEGATIVE
NITRITE UR QL: NEGATIVE
NRBC # BLD: 0 K/UL (ref 0–0.01)
NRBC BLD-RTO: 0 PER 100 WBC
PH UR: 6.5 (ref 5–9)
PH UR: 7 (ref 5–9)
PLATELET # BLD AUTO: 195 K/UL (ref 135–420)
PMV BLD AUTO: 9 FL (ref 9.2–11.8)
PROT UR QL: 100 MG/DL
PROT UR QL: 30 MG/DL
RBC # BLD AUTO: 2.65 M/UL (ref 4.2–5.3)
RBC # UR STRIP: ABNORMAL
RBC # UR STRIP: ABNORMAL
RBC MORPH BLD: ABNORMAL
SERVICE CMNT-IMP: ABNORMAL
SERVICE CMNT-IMP: ABNORMAL
SP GR UR: >1.03 (ref 1–1.02)
SP GR UR: >1.03 (ref 1–1.02)
SPECIMEN EXP DATE BLD: NORMAL
UROBILINOGEN UR QL: 1 EU/DL (ref 0.2–1)
UROBILINOGEN UR QL: 1 EU/DL (ref 0.2–1)
WBC # BLD AUTO: 20.2 K/UL (ref 4.6–13.2)

## 2023-01-03 PROCEDURE — 74011250636 HC RX REV CODE- 250/636: Performed by: ADVANCED PRACTICE MIDWIFE

## 2023-01-03 PROCEDURE — 96361 HYDRATE IV INFUSION ADD-ON: CPT

## 2023-01-03 PROCEDURE — 96372 THER/PROPH/DIAG INJ SC/IM: CPT

## 2023-01-03 PROCEDURE — 00HU33Z INSERTION OF INFUSION DEVICE INTO SPINAL CANAL, PERCUTANEOUS APPROACH: ICD-10-PCS | Performed by: ANESTHESIOLOGY

## 2023-01-03 PROCEDURE — 74011250636 HC RX REV CODE- 250/636

## 2023-01-03 PROCEDURE — 96365 THER/PROPH/DIAG IV INF INIT: CPT

## 2023-01-03 PROCEDURE — 74011250637 HC RX REV CODE- 250/637

## 2023-01-03 PROCEDURE — 96360 HYDRATION IV INFUSION INIT: CPT

## 2023-01-03 PROCEDURE — 74011000258 HC RX REV CODE- 258

## 2023-01-03 PROCEDURE — 86900 BLOOD TYPING SEROLOGIC ABO: CPT

## 2023-01-03 PROCEDURE — 96374 THER/PROPH/DIAG INJ IV PUSH: CPT

## 2023-01-03 PROCEDURE — 86870 RBC ANTIBODY IDENTIFICATION: CPT

## 2023-01-03 PROCEDURE — 65270000029 HC RM PRIVATE

## 2023-01-03 PROCEDURE — 76060000078 HC EPIDURAL ANESTHESIA

## 2023-01-03 PROCEDURE — 85025 COMPLETE CBC W/AUTO DIFF WBC: CPT

## 2023-01-03 PROCEDURE — 74011250637 HC RX REV CODE- 250/637: Performed by: ADVANCED PRACTICE MIDWIFE

## 2023-01-03 PROCEDURE — 96366 THER/PROPH/DIAG IV INF ADDON: CPT

## 2023-01-03 PROCEDURE — 59025 FETAL NON-STRESS TEST: CPT

## 2023-01-03 PROCEDURE — G0378 HOSPITAL OBSERVATION PER HR: HCPCS

## 2023-01-03 PROCEDURE — 81003 URINALYSIS AUTO W/O SCOPE: CPT

## 2023-01-03 PROCEDURE — 99285 EMERGENCY DEPT VISIT HI MDM: CPT

## 2023-01-03 PROCEDURE — 75410000002 HC LABOR FEE PER 1 HR

## 2023-01-03 RX ORDER — MINERAL OIL
30 OIL (ML) ORAL AS NEEDED
Status: COMPLETED | OUTPATIENT
Start: 2023-01-03 | End: 2023-01-04

## 2023-01-03 RX ORDER — OXYTOCIN/0.9 % SODIUM CHLORIDE 30/500 ML
87.3 PLASTIC BAG, INJECTION (ML) INTRAVENOUS AS NEEDED
Status: DISCONTINUED | OUTPATIENT
Start: 2023-01-03 | End: 2023-01-04 | Stop reason: HOSPADM

## 2023-01-03 RX ORDER — SODIUM CHLORIDE, SODIUM LACTATE, POTASSIUM CHLORIDE, CALCIUM CHLORIDE 600; 310; 30; 20 MG/100ML; MG/100ML; MG/100ML; MG/100ML
125 INJECTION, SOLUTION INTRAVENOUS CONTINUOUS
Status: DISCONTINUED | OUTPATIENT
Start: 2023-01-03 | End: 2023-01-04 | Stop reason: HOSPADM

## 2023-01-03 RX ORDER — FENTANYL/ROPIVACAINE/NS/PF 2MCG/ML-.1
PLASTIC BAG, INJECTION (ML) EPIDURAL
Status: COMPLETED
Start: 2023-01-03 | End: 2023-01-03

## 2023-01-03 RX ORDER — NALBUPHINE HYDROCHLORIDE 10 MG/ML
10 INJECTION, SOLUTION INTRAMUSCULAR; INTRAVENOUS; SUBCUTANEOUS
Status: DISCONTINUED | OUTPATIENT
Start: 2023-01-03 | End: 2023-01-04 | Stop reason: HOSPADM

## 2023-01-03 RX ORDER — LIDOCAINE HYDROCHLORIDE 10 MG/ML
20 INJECTION, SOLUTION EPIDURAL; INFILTRATION; INTRACAUDAL; PERINEURAL AS NEEDED
Status: DISCONTINUED | OUTPATIENT
Start: 2023-01-03 | End: 2023-01-04 | Stop reason: HOSPADM

## 2023-01-03 RX ORDER — NIFEDIPINE 10 MG/1
30 CAPSULE ORAL ONCE
Status: COMPLETED | OUTPATIENT
Start: 2023-01-03 | End: 2023-01-03

## 2023-01-03 RX ORDER — NIFEDIPINE 30 MG/1
30 TABLET, EXTENDED RELEASE ORAL ONCE
Status: COMPLETED | OUTPATIENT
Start: 2023-01-03 | End: 2023-01-03

## 2023-01-03 RX ORDER — NIFEDIPINE 10 MG/1
10 CAPSULE ORAL EVERY 4 HOURS
Status: DISCONTINUED | OUTPATIENT
Start: 2023-01-03 | End: 2023-01-03

## 2023-01-03 RX ORDER — HYDROXYZINE 50 MG/ML
50 INJECTION, SOLUTION INTRAMUSCULAR ONCE
Status: COMPLETED | OUTPATIENT
Start: 2023-01-03 | End: 2023-01-03

## 2023-01-03 RX ORDER — TERBUTALINE SULFATE 1 MG/ML
0.25 INJECTION SUBCUTANEOUS
Status: DISCONTINUED | OUTPATIENT
Start: 2023-01-03 | End: 2023-01-04 | Stop reason: HOSPADM

## 2023-01-03 RX ORDER — METHYLERGONOVINE MALEATE 0.2 MG/ML
0.2 INJECTION INTRAVENOUS AS NEEDED
Status: DISCONTINUED | OUTPATIENT
Start: 2023-01-03 | End: 2023-01-04 | Stop reason: HOSPADM

## 2023-01-03 RX ORDER — PHENYLEPHRINE HCL IN 0.9% NACL 1 MG/10 ML
80 SYRINGE (ML) INTRAVENOUS AS NEEDED
Status: DISCONTINUED | OUTPATIENT
Start: 2023-01-03 | End: 2023-01-04 | Stop reason: HOSPADM

## 2023-01-03 RX ORDER — NALOXONE HYDROCHLORIDE 0.4 MG/ML
0.2 INJECTION, SOLUTION INTRAMUSCULAR; INTRAVENOUS; SUBCUTANEOUS AS NEEDED
Status: DISCONTINUED | OUTPATIENT
Start: 2023-01-03 | End: 2023-01-04 | Stop reason: HOSPADM

## 2023-01-03 RX ORDER — MORPHINE SULFATE 10 MG/ML
5 INJECTION, SOLUTION INTRAMUSCULAR; INTRAVENOUS ONCE
Status: COMPLETED | OUTPATIENT
Start: 2023-01-03 | End: 2023-01-03

## 2023-01-03 RX ORDER — FENTANYL/ROPIVACAINE/NS/PF 2MCG/ML-.1
1-15 PLASTIC BAG, INJECTION (ML) EPIDURAL
Status: DISCONTINUED | OUTPATIENT
Start: 2023-01-03 | End: 2023-01-04 | Stop reason: HOSPADM

## 2023-01-03 RX ORDER — FENTANYL CITRATE 50 UG/ML
INJECTION, SOLUTION INTRAMUSCULAR; INTRAVENOUS
Status: DISPENSED
Start: 2023-01-03 | End: 2023-01-04

## 2023-01-03 RX ORDER — OXYTOCIN/RINGER'S LACTATE 30/500 ML
10 PLASTIC BAG, INJECTION (ML) INTRAVENOUS AS NEEDED
Status: DISCONTINUED | OUTPATIENT
Start: 2023-01-03 | End: 2023-01-04 | Stop reason: HOSPADM

## 2023-01-03 RX ORDER — MORPHINE SULFATE 10 MG/ML
5 INJECTION, SOLUTION INTRAMUSCULAR; INTRAVENOUS ONCE
Status: DISCONTINUED | OUTPATIENT
Start: 2023-01-03 | End: 2023-01-03

## 2023-01-03 RX ORDER — TERBUTALINE SULFATE 1 MG/ML
0.25 INJECTION SUBCUTANEOUS
Status: COMPLETED | OUTPATIENT
Start: 2023-01-03 | End: 2023-01-03

## 2023-01-03 RX ORDER — SODIUM CHLORIDE 0.9 % (FLUSH) 0.9 %
5-40 SYRINGE (ML) INJECTION EVERY 8 HOURS
Status: DISCONTINUED | OUTPATIENT
Start: 2023-01-03 | End: 2023-01-04 | Stop reason: HOSPADM

## 2023-01-03 RX ORDER — HYDROMORPHONE HYDROCHLORIDE 2 MG/ML
2 INJECTION, SOLUTION INTRAMUSCULAR; INTRAVENOUS; SUBCUTANEOUS
Status: DISCONTINUED | OUTPATIENT
Start: 2023-01-03 | End: 2023-01-04 | Stop reason: HOSPADM

## 2023-01-03 RX ORDER — BUTORPHANOL TARTRATE 2 MG/ML
2 INJECTION INTRAMUSCULAR; INTRAVENOUS
Status: COMPLETED | OUTPATIENT
Start: 2023-01-03 | End: 2023-01-03

## 2023-01-03 RX ORDER — ONDANSETRON 2 MG/ML
4 INJECTION INTRAMUSCULAR; INTRAVENOUS
Status: DISCONTINUED | OUTPATIENT
Start: 2023-01-03 | End: 2023-01-04 | Stop reason: HOSPADM

## 2023-01-03 RX ORDER — TERBUTALINE SULFATE 1 MG/ML
INJECTION SUBCUTANEOUS
Status: COMPLETED
Start: 2023-01-03 | End: 2023-01-03

## 2023-01-03 RX ORDER — SODIUM CHLORIDE 0.9 % (FLUSH) 0.9 %
5-40 SYRINGE (ML) INJECTION AS NEEDED
Status: DISCONTINUED | OUTPATIENT
Start: 2023-01-03 | End: 2023-01-04 | Stop reason: HOSPADM

## 2023-01-03 RX ADMIN — MORPHINE SULFATE 5 MG: 10 INJECTION INTRAVENOUS at 08:33

## 2023-01-03 RX ADMIN — BUTORPHANOL TARTRATE 2 MG: 2 INJECTION, SOLUTION INTRAMUSCULAR; INTRAVENOUS at 01:44

## 2023-01-03 RX ADMIN — HYDROXYZINE HYDROCHLORIDE 50 MG: 50 INJECTION, SOLUTION INTRAMUSCULAR at 08:40

## 2023-01-03 RX ADMIN — NIFEDIPINE 30 MG: 10 CAPSULE ORAL at 10:39

## 2023-01-03 RX ADMIN — SODIUM CHLORIDE 5 MILLION UNITS: 900 INJECTION INTRAVENOUS at 16:19

## 2023-01-03 RX ADMIN — SODIUM CHLORIDE, POTASSIUM CHLORIDE, SODIUM LACTATE AND CALCIUM CHLORIDE 1000 ML: 600; 310; 30; 20 INJECTION, SOLUTION INTRAVENOUS at 06:13

## 2023-01-03 RX ADMIN — SODIUM CHLORIDE 2.5 MILLION UNITS: 9 INJECTION, SOLUTION INTRAVENOUS at 21:05

## 2023-01-03 RX ADMIN — ROPIVACAINE HYDROCHLORIDE: 5 INJECTION, SOLUTION EPIDURAL; INFILTRATION; PERINEURAL at 15:31

## 2023-01-03 RX ADMIN — ROPIVACAINE HYDROCHLORIDE 12 ML/HR: 5 INJECTION, SOLUTION EPIDURAL; INFILTRATION; PERINEURAL at 21:00

## 2023-01-03 RX ADMIN — TERBUTALINE SULFATE 0.25 MG: 1 INJECTION, SOLUTION SUBCUTANEOUS at 04:40

## 2023-01-03 RX ADMIN — TERBUTALINE SULFATE 0.25 MG: 1 INJECTION SUBCUTANEOUS at 04:40

## 2023-01-03 RX ADMIN — SODIUM CHLORIDE, POTASSIUM CHLORIDE, SODIUM LACTATE AND CALCIUM CHLORIDE 1000 ML: 600; 310; 30; 20 INJECTION, SOLUTION INTRAVENOUS at 04:26

## 2023-01-03 RX ADMIN — ONDANSETRON 4 MG: 2 INJECTION INTRAMUSCULAR; INTRAVENOUS at 00:00

## 2023-01-03 RX ADMIN — NIFEDIPINE 30 MG: 30 TABLET, EXTENDED RELEASE ORAL at 08:32

## 2023-01-03 RX ADMIN — SODIUM CHLORIDE, POTASSIUM CHLORIDE, SODIUM LACTATE AND CALCIUM CHLORIDE 125 ML/HR: 600; 310; 30; 20 INJECTION, SOLUTION INTRAVENOUS at 15:30

## 2023-01-03 RX ADMIN — MORPHINE SULFATE 5 MG: 10 INJECTION INTRAVENOUS at 08:37

## 2023-01-03 RX ADMIN — Medication 12 ML/HR: at 21:00

## 2023-01-03 NOTE — H&P
Obstetrical History and Physical    Subjective:     Date of Admission: 1/3/2023    Patient is a 25 y.o.   [place] female admitted with  labor at 28 6/7 weeks gestation. History significant for  labor with betamethasone x 2 at 33 0/7 weeks, anemia, GBS positive status, prior CS x 2 (first for malpresentation and second was repeat), desires TOLAC, RH negative. For Obstetric history, see prenantal.    For Review of Systems, see prenatal    Past Medical History:   Diagnosis Date    Anemia     Rhesus isoimmunization affecting pregnancy       Past Surgical History:   Procedure Laterality Date    HX  SECTION        Prior to Admission medications    Medication Sig Start Date End Date Taking? Authorizing Provider   acetaminophen (TYLENOL) 500 mg tablet Take 500 mg by mouth as needed for Pain. Yes Provider, Historical   ferrous sulfate 325 mg (65 mg iron) tablet Take  by mouth Daily (before breakfast). Yes Provider, Historical   PNV No12-Iron-FA-DSS-OM-3 29 mg iron-1 mg -50 mg CPKD Take 1 Tab by mouth. Yes Provider, Historical     No Known Allergies   Social History     Tobacco Use    Smoking status: Never    Smokeless tobacco: Never   Substance Use Topics    Alcohol use: No      No family history on file. Objective:     Blood pressure 133/86, pulse 88, temperature 99.5 °F (37.5 °C), resp. rate 18, height 5' 4\" (1.626 m), weight 77.1 kg (170 lb), SpO2 98 %, unknown if currently breastfeeding. Temp (24hrs), Av.6 °F (37 °C), Min:97.9 °F (36.6 °C), Max:99.5 °F (37.5 °C)        No intake/output data recorded. No intake/output data recorded. HEENT: No pallor, no jaundice, Thyroid and throat normal  RESPIRATORY: Clear to A & P  CVS: pulse reg, HS normal  ABDOMEN: Gravid. Vertex. EFW=5 lb +-1lb. No abnormal tenderness.    Pelvic: Cervix 5,   Effaced: 90%  Station:  +1  Bulging bag    Data Review:   Recent Results (from the past 24 hour(s))   POC URINE MACROSCOPIC Collection Time: 01/03/23 12:30 AM   Result Value Ref Range    Color PEBBLES      Appearance SLIGHTLY CLOUDY      Spec. gravity (POC) >1.030 (H) 1.001 - 1.023    pH, urine  (POC) 7.0 5.0 - 9.0      Protein (POC) 100 (A) NEG mg/dL    Glucose, urine (POC) Negative NEG mg/dL    Ketones (POC) >160 (A) NEG mg/dL    Bilirubin (POC) Negative NEG      Blood (POC) MODERATE (A) NEG      Urobilinogen (POC) 1.0 0.2 - 1.0 EU/dL    Nitrite (POC) Negative NEG      Leukocyte esterase (POC) Negative NEG      Performed by Rut Gaviria    CBC WITH AUTOMATED DIFF    Collection Time: 01/03/23 10:25 AM   Result Value Ref Range    WBC 20.2 (H) 4.6 - 13.2 K/uL    RBC 2.65 (L) 4.20 - 5.30 M/uL    HGB 8.6 (L) 12.0 - 16.0 g/dL    HCT 24.1 (L) 35.0 - 45.0 %    MCV 90.9 78.0 - 100.0 FL    MCH 32.5 24.0 - 34.0 PG    MCHC 35.7 31.0 - 37.0 g/dL    RDW 13.6 11.6 - 14.5 %    PLATELET 184 662 - 360 K/uL    MPV 9.0 (L) 9.2 - 11.8 FL    NRBC 0.0 0  WBC    ABSOLUTE NRBC 0.00 0.00 - 0.01 K/uL    NEUTROPHILS 84 (H) 40 - 73 %    LYMPHOCYTES 6 (L) 21 - 52 %    MONOCYTES 9 3 - 10 %    EOSINOPHILS 0 0 - 5 %    BASOPHILS 0 0 - 2 %    IMMATURE GRANULOCYTES 1 (H) 0.0 - 0.5 %    ABS. NEUTROPHILS 17.0 (H) 1.8 - 8.0 K/UL    ABS. LYMPHOCYTES 1.2 0.9 - 3.6 K/UL    ABS. MONOCYTES 1.8 (H) 0.05 - 1.2 K/UL    ABS. EOSINOPHILS 0.0 0.0 - 0.4 K/UL    ABS. BASOPHILS 0.0 0.0 - 0.1 K/UL    ABS. IMM. GRANS. 0.2 (H) 0.00 - 0.04 K/UL    DF AUTOMATED      RBC COMMENTS NORMOCYTIC, NORMOCHROMIC     TYPE & SCREEN    Collection Time: 01/03/23 10:25 AM   Result Value Ref Range    Crossmatch Expiration 01/06/2023,9349     ABO/Rh(D) B NEGATIVE     Antibody screen POS     Antibody ID PENDING     CALLED TO:       POSITIVE AB CALLED TO GEOFFREY DUGGANMIDWIFE, L&D ON 01/03/2023 AT 1152 BY [facility]    CALLED TO: PENDING     Comment       PERFORMED MANUAL TUBE TYPING.   SAMPLES PREWARMED BEFORE RESULTING     Monitor:    FHR:  baseline 150, moderate variability, + accels, + variable  Uterine activity: contractions 2-7 mins, moderate on palpation     Assessment:     Active Problems:    History of  delivery (1/3/2023)      IUP (intrauterine pregnancy), incidental (1/3/2023)      35 weeks gestation of pregnancy (1/3/2023)      Uterine contractions (1/3/2023)        Plan:       Check labs:  CBC, T&S     Disposition:   Admit to labor and delivery   IV- LR  Labs as ordered  GBS positive - PCN per protocol  Anesthesia consult if desires CHANO  Continuous fetal monitoring  Anticipate vaginal delivery  CS for maternal/fetal indications  Dr. Narendra Webber aware of admission and POC     I saw and examined patient.     Signed By: Alma Shrestha CNM                         January 3, 2023

## 2023-01-03 NOTE — PROGRESS NOTES
Progress Note    Patient seen, fetal heart rate and contraction pattern evaluated. Physical Exam:  Pelvic: Cervix 4,   Effaced: 90%  Station:  0     Intact  Contractions: Every 5-6 minutes, mild  Fetal Heart Rate: Baseline: 155 per minute  Variability: moderate  Accelerations: yes  Decelerations: none  Cat I FHR tracing    Assessment:  Not in labor.  contractions. No cervical change in 8 hours. PLAN:  Reassuring fetal status;  Procardia 30 mg XL, Morphine 10 mg IV/10 mg IM with vistaril 50 mg IM for therapeutic rest.  Continue to evaluate.     [address] , [facility]  1/3/2023  8:22 AM

## 2023-01-03 NOTE — CONSULTS
[address]  Prenatal Consult    Chandler Tejada MRN: [id number] [facility]: [id number]  Note Date: 2023 Note Time: 16:00:00  Place of Service: Labor and Delivery    Reason for Consultation: 35 6/7 weeks with  labor    Maternal History  : 2000Mother's Age: 22Blood Type: B NegMother's Race: BlackG:  3P:  2  RPR Serology: Non-ReactiveHIV: NegativeRubella:  ImmuneGBS: PositiveHBsAg: Negative  Prenatal Care: YesEDC OB: 2023    Pregnancy Complications  Premature onset of labor, w/o delivery, 3rd trimester (O60.03)    Maternal Steroids Yes  Last Dose Date: 12/15/2022Next Recent Dose Date: 2022    Maternal Medications: Yes  Prenatal vitamins  Iron  Colace  Penicillin    Pregnancy Comment   labor with h/o 2 previous term C/S, desires TOLAC    Present Plan  Admitted for active labor    Recommendations  I have reviewed the mother`s medical chart and spoken with the obstetrician requesting this consult. I met with the mother and father. They are having a girl. I reviewed the most common problems encountered for infants who remain premature but are approaching term, including but not limited to feeding immaturity, poor thermoregulation, hypoglycemia , and respiratory distress. While most any organ system can have a complication, the absolute risk of severe complications is very low at this gestational age and the opportunity for a good outcome is high. I discussed the delivery room management and explained the personnel that will be present at delivery. I reviewed the plan for delayed cord clamping (if appropriate) and thermoregulation support. Some infants at this age need respiratory support due to lung immaturity, commonly CPAP or a nasal cannula, though rarely some need mechanical ventilation. While uncommon, some infants need other measures in their resuscitation (e.g. CPR, fluid, blood). I reviewed the typical cares given during admission to the NICU. Some infants at this gestation need IV access and that may be an umbilical catheter(s), PICC line or peripheral IV to allow nutrition. When sufficiently stable, gavage and/or PO feedings will be started. We discussed the critical importance of lactation to optimize outcome. However, mother has expressed plan to formula feed. We discussed common discharge goals, including mature thermoregulation, mature respiratory status and ability to thrive on oral feedings. Many infants achieve this around 36-39 weeks corrected gestation, although some infants are ready sooner and some take longer. Time was allotted for the family to ask questions, and all questions were answered to the best of my ability, given the information provided. The family understands and agrees with the present plan. Thank you for inviting me to speak with the family. We remain available if the family desires further discussion or clarification. The total length of floor unit time was 20 minute(s). Counseling and/or coordination of care dominated more than fifty percent of the time.   Authenticated by: Annalee Barnard MD   Date/Time: 01/03/2023 18:19

## 2023-01-03 NOTE — PROGRESS NOTES
1515 at bedside explaining epidural procedure, side effects, risks, benefits, and positioning. Patient verbalizes understanding. Time-out: 1520  Procedure start: 1521  Catheter in, needle out: 1529  Test dose: 1530  Fentanyl vial handed to MD at bedside.   Loading dose: 1531  Patient connected to pump: 1532

## 2023-01-03 NOTE — PROCEDURES
Epidural Catheter Placement    Indication: Labor analgesia. Procedure start and finish times: 9312-9576     : Virgle Mortimer, MD      Risks, including dural puncture headache, nerve injury, partial  pain relief, paralysis,  as well as  benefits,  discussed. Time out performed, correct patient and site identified. Patient placed in sitting position, back prepped with Chorhexidine and draped with sterile drap. Sterile gloves donned, local-1% lidocaine used in skin and subcutaneous tissue, approximately 1 ml used. 17 G Tuohy needle advanced at L3-4  level., midline approach, + NIKKIE obtained with saline, no heme or CSF noted, depth from skin surface 7 cms. Epidural catheter placed via Tuohy needle and secured at 12  cms at skin. Catheter secured with clear occlusive dressing and perforated tape. Negative test dose with 1 mLs 1.5% lidocaine w/1:200,000 epinephrine. Catheter dosed with Fentanyl 100 mcg plus 13 ml 0.2% Ropiv. No apparent complications. See nurses notes for VS. Infusion started by RN.

## 2023-01-03 NOTE — PROGRESS NOTES
RN at bedside, patient administered PO Procardia followed by sips of water. Patient felt the urge to vomit shortly after administration so RN handed patient emesis bag. Patient vomited moderate amount of vomit into emesis bag. RN communicated findings with CNM.

## 2023-01-03 NOTE — PROGRESS NOTES
Triage Progress Note      Patient presented to triage with c/o contractions and nausea and vomiting at 35+6. She was previously admitted for possible  labor and ANCS in mid December. She has had 2 previous c-sections and desires a TOLAC. Physical Exam:  Cervical Exam: 4/90/0  Membranes:  intact  Uterine Activity: 2-7 minutes lasting  seconds  Fetal Heart Rate: 135 bpm  Variability: moderate  Accelerations: +  Decelerations: absent     Assessment/Plan:  Reassuring fetal status, dehydration. Lactated Ringers fluid bolus followed by 125mL/hr   IV phenergan 12.5mg    IV zofran 4mg   IV pepcid 20mg   Terbutaline . 25mg SQ    Cervix unchanged after 4 hours, will reassess as needed.     Kia Moss CNM

## 2023-01-03 NOTE — PROGRESS NOTES
Problem:  Labor  Goal: *Prevention/Cessation of labor signs/symptoms  Outcome: Progressing Towards Goal  Goal: *Reassuring fetal surveillance  Outcome: Progressing Towards Goal     Problem: Pain  Goal: *Control of Pain  Outcome: Progressing Towards Goal  Goal: *PALLIATIVE CARE:  Alleviation of Pain  Outcome: Progressing Towards Goal     Problem: Patient Education: Go to Patient Education Activity  Goal: Patient/Family Education  Outcome: Progressing Towards Goal

## 2023-01-03 NOTE — PROGRESS NOTES
0292 SHANEKA Carlos called on her cell phone and informed of arrival, pain level, vomiting and SVE. Orders received and relayed to primary RN.

## 2023-01-03 NOTE — PROGRESS NOTES
2305- Pt arrived to L&D with c/o contractions and spotting, G3,P2, 35 weeks. Pt of Dr. Kim Charles. Pt denies VB or LOF. 2317 SHANEKA Minor Hannacroix called. Orders for fluid bolus of LR, Phenergan, Zofran were given. Nothing for pain at this time. 0015- Pt stated she has not eaten since 5 PM. PO fluids, peanut butter, and wagner crackers     0025-Assisted pt to restroom for urine sample and to change into gown. 0123- SHANEKA Sumner CNM updated about unchanged SVE. Stadol orders for 2mg IV one time. 0330- SHANEKA Sumner CNM updated about SVE exam unchanged. 8175- Pt decided she wanted to consult about finding a car seat    0715- Verbal and Written shift change report given to Zane Sherman RN (oncoming nurse) by DM Miranda RN  (offgoing nurse). Report included the following information SBAR, Kardex, Procedure Summary, Intake/Output, MAR, Accordion, Recent Results, Med Rec Status, and Quality Measures.

## 2023-01-04 PROCEDURE — 74011250636 HC RX REV CODE- 250/636

## 2023-01-04 PROCEDURE — 74011250636 HC RX REV CODE- 250/636: Performed by: ADVANCED PRACTICE MIDWIFE

## 2023-01-04 PROCEDURE — 74011000258 HC RX REV CODE- 258

## 2023-01-04 PROCEDURE — 74011250637 HC RX REV CODE- 250/637: Performed by: ADVANCED PRACTICE MIDWIFE

## 2023-01-04 PROCEDURE — 75410000002 HC LABOR FEE PER 1 HR

## 2023-01-04 PROCEDURE — 74011250637 HC RX REV CODE- 250/637

## 2023-01-04 PROCEDURE — 10907ZC DRAINAGE OF AMNIOTIC FLUID, THERAPEUTIC FROM PRODUCTS OF CONCEPTION, VIA NATURAL OR ARTIFICIAL OPENING: ICD-10-PCS | Performed by: OBSTETRICS & GYNECOLOGY

## 2023-01-04 PROCEDURE — 76060000078 HC EPIDURAL ANESTHESIA

## 2023-01-04 PROCEDURE — 75410000003 HC RECOV DEL/VAG/CSECN EA 0.5 HR

## 2023-01-04 PROCEDURE — 75410000000 HC DELIVERY VAGINAL/SINGLE

## 2023-01-04 PROCEDURE — 88307 TISSUE EXAM BY PATHOLOGIST: CPT

## 2023-01-04 PROCEDURE — 65270000029 HC RM PRIVATE

## 2023-01-04 RX ORDER — AMPICILLIN 2 G/1
2 INJECTION, POWDER, FOR SOLUTION INTRAVENOUS EVERY 6 HOURS
Status: DISCONTINUED | OUTPATIENT
Start: 2023-01-04 | End: 2023-01-04

## 2023-01-04 RX ORDER — IBUPROFEN 400 MG/1
800 TABLET ORAL
Status: DISCONTINUED | OUTPATIENT
Start: 2023-01-04 | End: 2023-01-05 | Stop reason: HOSPADM

## 2023-01-04 RX ORDER — ACETAMINOPHEN 325 MG/1
650 TABLET ORAL
Status: DISCONTINUED | OUTPATIENT
Start: 2023-01-04 | End: 2023-01-05 | Stop reason: HOSPADM

## 2023-01-04 RX ORDER — LANOLIN ALCOHOL/MO/W.PET/CERES
3 CREAM (GRAM) TOPICAL
Status: DISCONTINUED | OUTPATIENT
Start: 2023-01-04 | End: 2023-01-05 | Stop reason: HOSPADM

## 2023-01-04 RX ORDER — PROMETHAZINE HYDROCHLORIDE 25 MG/ML
25 INJECTION, SOLUTION INTRAMUSCULAR; INTRAVENOUS
Status: DISCONTINUED | OUTPATIENT
Start: 2023-01-04 | End: 2023-01-05 | Stop reason: HOSPADM

## 2023-01-04 RX ORDER — FENTANYL/ROPIVACAINE/NS/PF 2MCG/ML-.1
PLASTIC BAG, INJECTION (ML) EPIDURAL
Status: DISPENSED
Start: 2023-01-04 | End: 2023-01-04

## 2023-01-04 RX ORDER — AMOXICILLIN 250 MG
1 CAPSULE ORAL
Status: DISCONTINUED | OUTPATIENT
Start: 2023-01-04 | End: 2023-01-05 | Stop reason: HOSPADM

## 2023-01-04 RX ADMIN — ACETAMINOPHEN 650 MG: 325 TABLET ORAL at 12:35

## 2023-01-04 RX ADMIN — SODIUM CHLORIDE, POTASSIUM CHLORIDE, SODIUM LACTATE AND CALCIUM CHLORIDE 125 ML/HR: 600; 310; 30; 20 INJECTION, SOLUTION INTRAVENOUS at 00:56

## 2023-01-04 RX ADMIN — MINERAL OIL 30 ML: 1000 SOLUTION ORAL at 04:50

## 2023-01-04 RX ADMIN — ACETAMINOPHEN 650 MG: 325 TABLET ORAL at 20:46

## 2023-01-04 RX ADMIN — SODIUM CHLORIDE 2.5 MILLION UNITS: 9 INJECTION, SOLUTION INTRAVENOUS at 00:56

## 2023-01-04 RX ADMIN — IBUPROFEN 800 MG: 400 TABLET, FILM COATED ORAL at 06:29

## 2023-01-04 RX ADMIN — IBUPROFEN 800 MG: 400 TABLET, FILM COATED ORAL at 14:42

## 2023-01-04 RX ADMIN — Medication 12 ML/HR: at 01:08

## 2023-01-04 NOTE — PROGRESS NOTES
Labor Progress Note    Patient seen, fetal heart rate and contraction pattern evaluated. Physical Exam:  Pelvic: Cervix 5,   Effaced: 90%  Station:  0     Intact  Contractions: Every 2-5 minutes, moderate  Fetal Heart Rate: Baseline: 145 per minute  Variability: moderate  Accelerations: yes  Decelerations: none    Assessment:  Active phase labor. PLAN:  Reassuring fetal status, Labor  Progressing normally  Continue expectant management, Continue plan for vaginal delivery; Consulted with Dr. Oracio Velasquez, aware of POC.      [address] [place] [address], [facility]  1/4/2023  1:50 AM

## 2023-01-04 NOTE — PROGRESS NOTES
Labor Progress Note    Patient seen, fetal heart rate and contraction pattern evaluated. Physical Exam:  Pelvic: Cervix 5,   Effaced: 90%  Station:  +1     Artificial Rupture of Membranes; Amniotic Fluid: large amount of clear fluid  Contractions: Every 2-3 minutes, moderate  Fetal Heart Rate: Baseline: 155 per minute  Variability: moderate  Accelerations: yes  Decelerations: variable  Cat II FHR tracing     Assessment:  Active phase labor.     PLAN:  Reassuring fetal status, Labor  Progressing normally  Continue expectant management, Continue plan for vaginal delivery    Qian Byrd CNM  1/4/2023  1:44 AM

## 2023-01-04 NOTE — PROGRESS NOTES
9191 - Report received from Giuliana Spear RN. Care assumed at this time. 46 -  of viable female infant. 4768 - Patient placed on bedpan, voided 150.     0629 - PRN motrin administered for pain 6/10.     1945 - Patient voided additional 175 on bedpan. 0710 - Verbal shift change report given to MILADY Pacheco, RN by Therese Waldrop RN. Report included the following information SBAR, Intake/Output and MAR.

## 2023-01-04 NOTE — L&D DELIVERY NOTE
Vaginal Delivery Procedure Note    Name: Mathew Deluca Record Number: [id number]   YOB: 2000  Today's Date: 2023    Procedure: VAGINAL DELIVERY     Anesthesia: yes       Type - 1% xylocaine local:no  Epidural: yes    Extra Procedure Details:       Estimated Blood Loss: 200 ccs    Fetal Description:  female     Anterior shoulder: left    Episiotomy: no   Tear: no  Degree: N/A degree              Cord Blood Results:   Information for the patient's :  Bobbi Garnica [[id number]]   No components found for: ABG       Apgars: 8/9    Birth Information:   Information for the patient's :  Bobbi Garnica [[id number]]         Placenta: delivered spontaneously at 079 0070 2298, appears intact, 3 vessel cord    Specimens: Placenta was sent           Complications:   delivery      Birth Weight: 6# 4 oz     Mother's Condition: good  Baby's Condition: good    Pt feeling urge to push and was found to be 10/100/+1. Pt started pushing adequately with coaching. Fetal head crowned and head delivered shortly after in OA position which then restituted to VIDYA. No cord was noted and the anterior shoulder followed without difficulty, baby was born at 46 and placed on the maternal abdomen skin to skin. Pitocin IV started for active third stage management. Cord was clamped after 3 mins when pulsation ceased and cut by the FOB. Cord blood was collected. Placenta was delivered intact in Pineda kelvin position at  079 0070 2298. Fundus was firm at U-1. Perineum was inspected and was intact. . Counts correct x 3 . Placenta noted to have profuse calcifications and was sent to pathology. Mom and baby stable and bonding. I was present for the delivery.     Signed: Alyssa Sultana CNM      2023

## 2023-01-04 NOTE — PROGRESS NOTES
0800 TRANSFER - IN REPORT:    Verbal report received from DEN More RN and MILADY Rodriguez RN(name) on [facility]  being received from L&D(unit) for routine progression of care      Report consisted of patients Situation, Background, Assessment and   Recommendations(SBAR). Information from the following report(s) SBAR, Kardex, Procedure Summary, Intake/Output, MAR, and Recent Results was reviewed with the receiving nurse. Opportunity for questions and clarification was provided. Assessment completed upon patients arrival to unit and care assumed.

## 2023-01-04 NOTE — PROGRESS NOTES
2330 Bedside report received from Edgar Garcia RN. Pumps verified. 99 Devin Garcia with kole care. Pads changed. Assisted to turn to right lateral position. 0136 MILADY ROSASM at bedside. AROM for clear fluid. Pads changed. Assisted to left lateral position. 56 MILADY France CNM at bedside. 6812  Complete and pushing. 0639 Report given to MILADY Drummond RN. Relinquished care at this time.

## 2023-01-04 NOTE — PROGRESS NOTES
0715- Bedside and Verbal shift change report given to Davidson Aponte. Rogelio Pacheco RN (oncoming nurse) by Nohelia Traylor. Moriah Nelson RN (offgoing nurse). Report included the following information SBAR, Kardex, Intake/Output, MAR, Accordion, and Recent Results. 0730- Pt up to void. Tolerated well.    0800- Verbal Bedside report given to DEN Austin RN on this patient received from L&D (unit) for routine progression of care       Report consisted of patients Situation, Background, Assessment and  Recommendations(SBAR). was reviewed with the receiving nurse. Opportunity for questions and clarification was provided.

## 2023-01-04 NOTE — PROGRESS NOTES
Bedside and Verbal shift change report given to PERLA Hampton RN (oncoming nurse) by Ania Patiño RN (offgoing nurse). Report included the following information SBAR, Kardex, Procedure Summary, Intake/Output, MAR, and Recent Results.

## 2023-01-04 NOTE — PROGRESS NOTES
Bedside and Verbal shift change report given to John Josue RN (oncoming nurse) by Olga Winston RN (offgoing nurse). Report included the following information SBAR, Kardex, Procedure Summary, Intake/Output, MAR, and Recent Results.

## 2023-01-05 VITALS
TEMPERATURE: 98.2 F | SYSTOLIC BLOOD PRESSURE: 127 MMHG | BODY MASS INDEX: 29.02 KG/M2 | RESPIRATION RATE: 17 BRPM | HEIGHT: 64 IN | DIASTOLIC BLOOD PRESSURE: 75 MMHG | OXYGEN SATURATION: 100 % | HEART RATE: 70 BPM | WEIGHT: 170 LBS

## 2023-01-05 LAB
HCT VFR BLD AUTO: 22.5 % (ref 35–45)
HGB BLD-MCNC: 7.4 G/DL (ref 12–16)

## 2023-01-05 PROCEDURE — 85018 HEMOGLOBIN: CPT

## 2023-01-05 PROCEDURE — 36415 COLL VENOUS BLD VENIPUNCTURE: CPT

## 2023-01-05 PROCEDURE — 74011250637 HC RX REV CODE- 250/637

## 2023-01-05 RX ORDER — IBUPROFEN 800 MG/1
800 TABLET ORAL
Qty: 90 TABLET | Refills: 0 | Status: SHIPPED | OUTPATIENT
Start: 2023-01-05

## 2023-01-05 RX ADMIN — IBUPROFEN 800 MG: 400 TABLET, FILM COATED ORAL at 00:42

## 2023-01-05 RX ADMIN — IBUPROFEN 800 MG: 400 TABLET, FILM COATED ORAL at 07:58

## 2023-01-05 RX ADMIN — ACETAMINOPHEN 650 MG: 325 TABLET ORAL at 14:24

## 2023-01-05 NOTE — PROGRESS NOTES
Bedside and Verbal shift change report given to 41 Natacha Rivas (oncoming nurse) by Kim Xavier RN (offgoing nurse). Report included the following information SBAR, Kardex, Procedure Summary, Intake/Output, MAR, and Recent Results.

## 2023-01-05 NOTE — DISCHARGE SUMMARY
Obstetrical Discharge Summary     Name: Maria G Guo MRN: [id number]  SSN: [id number]    YOB: 2000  Age: 25 y.o. Sex: female      Allergies: Patient has no known allergies. Admit Date: 2023    Discharge Date: 2023     Admitting Physician: Bartolo Lundborg, MD     Attending Physician:  Evan Neal MD     * Admission Diagnoses: 35 weeks gestation of pregnancy [Z3A.35]  IUP (intrauterine pregnancy), incidental [Z33.1]  Uterine contractions [O47.9]  History of  delivery [Z98.891]  Active  labor [O60.10X0]    * Discharge Diagnoses:   Information for the patient's :  Henrique Ferrell [[id number]]   Delivery of a 2.83 kg female infant via Vaginal, Spontaneous on 2023 at 4:54 AM  by Lenard Pandya. Apgars were 8  and 9 . Additional Diagnoses:   Hospital Problems as of 2023 Date Reviewed: 9/15/2014            Codes Class Noted - Resolved POA    History of  delivery ICD-10-CM: Z98.891  ICD-9-CM: V45.89  1/3/2023 - Present Unknown        IUP (intrauterine pregnancy), incidental ICD-10-CM: Z33.1  ICD-9-CM: V22.2  1/3/2023 - Present Unknown        35 weeks gestation of pregnancy ICD-10-CM: Z3A.35  ICD-9-CM: V22.2  1/3/2023 - Present Unknown        Uterine contractions ICD-10-CM: O47.9  ICD-9-CM: 644.10  1/3/2023 - Present Unknown        Active  labor ICD-10-CM: O60.10X0  ICD-9-CM: 644.20  1/3/2023 - Present Unknown          Lab Results   Component Value Date/Time    ABO/Rh(D) B NEGATIVE 2023 10:25 AM    Rubella, External immune 2022 12:00 AM    GrBStrep, External negative 2020 12:00 AM    ABO,Rh B+ 2019 12:00 AM      Immunization History   Administered Date(s) Administered    Rho(D) Immune Globulin - IM 2020, 12/15/2022       * Procedures:   Procedure(s):  REPEAT LOW TRANSCERSE  SECTION,  EDGAR 23           * Discharge Condition: good    * Hospital Course: Normal hospital course following the delivery.     * Disposition: Home    Discharge Medications:   Current Discharge Medication List        START taking these medications    Details   ibuprofen (MOTRIN) 800 mg tablet Take 1 Tablet by mouth every eight (8) hours as needed (Pain scale 4-6). Qty: 90 Tablet, Refills: 0  Start date: 1/5/2023           CONTINUE these medications which have NOT CHANGED    Details   ferrous sulfate 325 mg (65 mg iron) tablet Take  by mouth Daily (before breakfast). PNV No12-Iron-FA-DSS-OM-3 29 mg iron-1 mg -50 mg CPKD Take 1 Tab by mouth. STOP taking these medications       acetaminophen (TYLENOL) 500 mg tablet Comments:   Reason for Stopping:               * Follow-up Care/Patient Instructions:   Activity: Activity as tolerated  Diet: Regular Diet  Wound Care: Keep wound clean and dry    Follow-up Information       Follow up With Specialties Details Why Contact Info    Javy Andrews MD Obstetrics & Gynecology, Gynecology, Obstetrics   [address] [facility] [address] [phone]      Ozie Aschoff, CNM Certified Nurse Midwife Schedule an appointment as soon as possible for a visit in 6 week(s)  [address] [place] [address]  [phone]

## 2023-01-05 NOTE — DISCHARGE INSTRUCTIONS
POST DELIVERY DISCHARGE INSTRUCTIONS    Name: Lord Crowley  YOB: 2000  Primary Diagnosis: Active Problems:    History of  delivery (1/3/2023)      IUP (intrauterine pregnancy), incidental (1/3/2023)      35 weeks gestation of pregnancy (1/3/2023)      Uterine contractions (1/3/2023)      Active  labor (1/3/2023)        General:     Diet/Diet Restrictions:  Eight 8-ounce glasses of fluid daily (water, juices); avoid excessive caffeine intake. Meals/snacks as desired which are high in fiber and carbohydrates and low in fat and cholesterol. Physical Activity / Restrictions / Safety:     Avoid heavy lifting, no more than the baby alone (not the baby in the car seat). Avoid intercourse until you are seen at your postpartum visit. No douching or tampon use. Check with obstetrician before starting or resuming an exercise program.         Discharge Instructions/Special Treatment/Home Care Needs:     Continue prenatal vitamins. Continue to use squirt bottle with warm water on your perineum after each bathroom use until bleeding stops. Call your doctor for the following:     Fever over 101 degrees by mouth. Vaginal bleeding that soaks two pads per hour for more than one hour. Red streaks or increased swelling of legs, painful red streaks on your breast.  If you feel extremely anxious or overwhelmed. If you have thoughts of harming yourself and/or your baby. Pain Management:     Pain Management:   Take Acetaminophen (Tylenol) or Ibuprofen (Advil, Motrin), as directed for pain. Use a warm Sitz bath 3 times daily to relieve perineal or hemorrhoidal discomfort. For hemorrhoidal discomfort, use Tucks and Anusol cream as needed and directed.     Follow-Up Care:     Appointment with MD:   [unfilled]  Telephone number: [phone]      Signed By: Saleem Sigala CNM                                                                                                          After Your Delivery (the Postpartum Period): Care Instructions  Overview     Congratulations on the birth of your baby. Like pregnancy, the  period can be a time of excitement, jam, and exhaustion. You may look at your wondrous little baby and feel happy. You may also be overwhelmed by your new sleep hours and new responsibilities. At first, babies often sleep during the days and are awake at night. They do not have a pattern or routine. They may make sudden gasps, jerk themselves awake, or look like they have crossed eyes. These are all normal, and they may even make you smile. In these first weeks after delivery, try to take good care of yourself. It may take 4 to 6 weeks to feel like yourself again, and possibly longer if you had a  birth. You will likely feel very tired for several weeks. Your days will be full of ups and downs, but lots of jam as well. Follow-up care is a key part of your treatment and safety. Be sure to make and go to all appointments, and call your doctor if you are having problems. It's also a good idea to know your test results and keep a list of the medicines you take. How can you care for yourself at home? Take care of your body after delivery  Use pads instead of tampons for the bloody flow that may last as long as 2 weeks. Ease cramps with ibuprofen (Advil, Motrin). Ease soreness of hemorrhoids and the area between your vagina and rectum with ice compresses or witch hazel pads. Ease constipation by drinking lots of fluid and eating high-fiber foods. Ask your doctor about over-the-counter stool softeners. Cleanse yourself with a gentle squeeze of warm water from a bottle instead of wiping with toilet paper. Take a sitz bath in warm water several times a day. Wear a good nursing bra. Ease sore and swollen breasts with warm, wet washcloths. If you aren't breastfeeding, use ice rather than heat for breast soreness.   Your period may not start for several months if you are breastfeeding. You may bleed more, and longer at first, than you did before you got pregnant. Wait until you are healed (about 4 to 6 weeks) before you have sex. Ask your doctor when it is okay for you to have sex. Try not to travel with your baby for 5 or 6 weeks. If you take a long car trip, make frequent stops to walk around and stretch. Avoid exhaustion  Rest every day. Try to nap when your baby naps. Ask another adult to be with you for a few days after delivery. Plan for  if you have other children. Stay flexible so you can eat at odd hours and sleep when you need to. Both you and your baby are making new schedules. Plan small trips to get out of the house. Change can make you feel less tired. Ask for help with housework, cooking, and shopping. Remind yourself that your job is to care for your baby. Know about help for postpartum depression  \"Baby blues\" are common for the first 1 to 2 weeks after birth. You may cry or feel sad or irritable for no reason. Rest whenever you can. Being tired makes it harder to handle your emotions. Go for walks with your baby. Talk to your partner, friends, and family about your feelings. If your symptoms last for more than a few weeks, or if you feel very depressed, ask your doctor for help. Postpartum depression can be treated. Support groups and counseling can help. Sometimes medicine can also help. Stay healthy  Eat healthy foods so you have more energy. If you breastfeed, avoid drugs. If you quit smoking during pregnancy, try to stay smoke-free. If you choose to have a drink now and then, have only one drink, and limit the number of occasions that you have a drink. Wait to breastfeed at least 2 hours after you have a drink to reduce the amount of alcohol the baby may get in the milk. Start daily exercise after 4 to 6 weeks, but rest when you feel tired. Learn exercises to tone your belly.  Try Kegel exercises to regain strength in your pelvic muscles. You can do these exercises while you stand or sit. (If doing these exercises causes pain, stop doing them and talk with your doctor.)  Squeeze your muscles as if you were trying not to pass gas. Or squeeze your muscles as if you were stopping the flow of urine. Your belly, legs, and buttocks shouldn't move. Hold the squeeze for 3 seconds, then relax for 5 to 10 seconds. Start with 3 seconds, then add 1 second each week until you are able to squeeze for 10 seconds. Repeat the exercise 10 times a session. Do 3 to 8 sessions a day. Find a class for you and your baby that has an exercise time. If you had a  birth, give yourself a bit more time before you exercise, and be careful. When should you call for help? Share this information with your partner, family, or a friend. They can help you watch for warning signs. Call 911  anytime you think you may need emergency care. For example, call if:    You have thoughts of harming yourself, your baby, or another person. You passed out (lost consciousness). You have chest pain, are short of breath, or cough up blood. You have a seizure. Call your doctor now or seek immediate medical care if:    You have signs of hemorrhage (too much bleeding), such as:  Heavy vaginal bleeding. This means that you are soaking through one or more pads in an hour. Or you pass blood clots bigger than an egg. Feeling dizzy or lightheaded, or you feel like you may faint. Feeling so tired or weak that you cannot do your usual activities. A fast or irregular heartbeat. New or worse belly pain. You have signs of infection, such as:  A fever. Vaginal discharge that smells bad. New or worse belly pain. You have symptoms of a blood clot in your leg (called a deep vein thrombosis), such as:  Pain in the calf, back of the knee, thigh, or groin. Redness and swelling in your leg or groin.      You have signs of preeclampsia, such as:  Sudden swelling of your face, hands, or feet. New vision problems (such as dimness, blurring, or seeing spots). A severe headache. Watch closely for changes in your health, and be sure to contact your doctor if:    Your vaginal bleeding isn't decreasing. You feel sad, anxious, or hopeless for more than a few days. You are having problems with your breasts or breastfeeding. Where can you learn more? Go to http://www.farias.com/  Enter A461 in the search box to learn more about \"After Your Delivery (the Postpartum Period): Care Instructions. \"  Current as of: February 23, 2022               Content Version: 13.4  © 3586-8206 [employer]. Care instructions adapted under license by [employer] (which disclaims liability or warranty for this information). If you have questions about a medical condition or this instruction, always ask your healthcare professional. [place] [address] any warranty or liability for your use of this information.

## 2023-01-05 NOTE — DISCHARGE SUMMARY
Obstetrical Discharge Summary     Name: Carlos Morris MRN: [id number]  SSN: [id number]    YOB: 2000  Age: 25 y.o. Sex: female      Admit Date: 2023    Discharge Date: 2023     Admitting Physician: Evonne Dance, MD     Attending Physician:  Amanda Penn MD     Admission Diagnoses: 35 weeks gestation of pregnancy [Z3A.35]  IUP (intrauterine pregnancy), incidental [Z33.1]  Uterine contractions [O47.9]  History of  delivery [Z98.891]  Active  labor [O60.10X0]    Discharge Diagnoses:   Information for the patient's :  Daquan Ray [[id number]]   Delivery of a 2.83 kg female infant via Vaginal, Spontaneous on 2023 at 4:54 AM  by Junior Capellan. Apgars were 8  and 9 . Additional Diagnoses:   Hospital Problems  Date Reviewed: 9/15/2014            Codes Class Noted POA    History of  delivery ICD-10-CM: Z98.891  ICD-9-CM: V45.89  1/3/2023 Unknown        IUP (intrauterine pregnancy), incidental ICD-10-CM: Z33.1  ICD-9-CM: V22.2  1/3/2023 Unknown        35 weeks gestation of pregnancy ICD-10-CM: Z3A.35  ICD-9-CM: V22.2  1/3/2023 Unknown        Uterine contractions ICD-10-CM: O47.9  ICD-9-CM: 644.10  1/3/2023 Unknown        Active  labor ICD-10-CM: O60.10X0  ICD-9-CM: 644.20  1/3/2023 Unknown          Lab Results   Component Value Date/Time    Rubella, External immune 2022 12:00 AM    GrBStrep, External negative 2020 12:00 AM       Immunization(s):   Immunization History   Administered Date(s) Administered    Rho(D) Immune Globulin - IM 2020, 12/15/2022        Hospital Course: Normal hospital course following the delivery. Patient Instructions:   Current Discharge Medication List        CONTINUE these medications which have NOT CHANGED    Details   acetaminophen (TYLENOL) 500 mg tablet Take 500 mg by mouth as needed for Pain. ferrous sulfate 325 mg (65 mg iron) tablet Take  by mouth Daily (before breakfast).       PNV No12-Iron-FA-DSS-OM-3 29 mg iron-1 mg -50 mg CPKD Take 1 Tab by mouth. Reference my discharge instructions. No orders of the defined types were placed in this encounter.        Signed By:  Rob Robertson     January 5, 2023

## 2023-01-05 NOTE — PROGRESS NOTES
Problem:  Labor  Goal: *Prevention/Cessation of labor signs/symptoms  2023 by Fady Mccartney ([place]) RN  Outcome: Progressing Towards Goal  2023 by Jose Hilliard RN  Outcome: Progressing Towards Goal  Goal: *Reassuring fetal surveillance  Outcome: Progressing Towards Goal     Problem: Pain  Goal: *Control of Pain  Outcome: Progressing Towards Goal  Goal: *PALLIATIVE CARE:  Alleviation of Pain  Outcome: Progressing Towards Goal     Problem: Patient Education: Go to Patient Education Activity  Goal: Patient/Family Education  Outcome: Progressing Towards Goal     Problem: Vaginal Delivery: Day of Delivery-Post delivery  Goal: Activity/Safety  Outcome: Progressing Towards Goal  Goal: Consults, if ordered  Outcome: Progressing Towards Goal  Goal: Nutrition/Diet  Outcome: Progressing Towards Goal  Goal: Discharge Planning  Outcome: Progressing Towards Goal  Goal: Medications  Outcome: Progressing Towards Goal  Goal: Treatments/Interventions/Procedures  Outcome: Progressing Towards Goal  Goal: *Vital signs within defined limits  Outcome: Progressing Towards Goal  Goal: *Labs within defined limits  Outcome: Progressing Towards Goal  Goal: *Hemodynamically stable  Outcome: Progressing Towards Goal  Goal: *Optimal pain control at patient's stated goal  Outcome: Progressing Towards Goal  Goal: *Participates in infant care  Outcome: Progressing Towards Goal  Goal: *Demonstrates progressive activity  Outcome: Progressing Towards Goal  Goal: *Tolerating diet  Outcome: Progressing Towards Goal

## 2023-01-05 NOTE — ROUTINE PROCESS
1910 Bedside shift change report given to [address] [place] [address], Rn (oncoming nurse) by Jennifer Mckeon Rn (offgoing nurse). Report included the following information SBAR, Intake/Output, MAR, and Recent Results. 0715 Bedside shift change report given to Savana Jackson RN (oncoming nurse) by Danae Schmitt (offgoing nurse). Report included the following information SBAR, Intake/Output, MAR, and Recent Results.

## 2023-01-05 NOTE — PROGRESS NOTES
0715: Bedside and verbal shift change report given to DEN Tsang RN  by Earl Pinto RN . Assumed care of pt at this time. 1817: D/C teaching complete and copy of D/C teaching instruction given to pt with verbal understanding. Pt given opportunity for questions and denies comments/concerns/questions at this time. Bands verified at this time.

## 2023-01-05 NOTE — PROGRESS NOTES
Progress Note    Patient: Karina Sidhu MRN: [id number]  SSN: [id number]    YOB: 2000  Age: 25 y.o. Sex: female      Subjective:     Postpartum Day: 1 Vaginal Delivery    The patient is without complaints. The patient is ambulating well. The patient is tolerating a normal diet. The baby is well. She is bottle feeding. Objective:      Patient Vitals for the past 8 hrs:   BP Temp Pulse Resp SpO2   01/05/23 0730 125/76 98.1 °F (36.7 °C) 79 17 100 %     LABS: Recent Results (from the past 24 hour(s))   HGB & HCT    Collection Time: 01/05/23  6:36 AM   Result Value Ref Range    HGB 7.4 (L) 12.0 - 16.0 g/dL    HCT 22.5 (L) 35.0 - 45.0 %       Lab Results   Component Value Date/Time    HGB 7.4 (L) 01/05/2023 06:36 AM     Lab Results   Component Value Date/Time    HCT 22.5 (L) 01/05/2023 06:36 AM      Lochia:  appropriate   Uterine Fundus:   firm, -1     Lab/Data Review: All lab results for the last 24 hours reviewed. Assessment:     Status post: Doing well postpartum vaginal delivery day 1. Plan:     Continue day 1 post-vaginal delivery orders. Postpartum care discussed including diet, ambulation, and actvitiy restrictions. Plan for discharge home today with follow-up at 6 weeks postpartum for a routine postpartum visit.     Signed By: Patricia Johnson     January 5, 2023

## 2023-01-06 NOTE — PROGRESS NOTES
Problem:  Labor  Goal: *Prevention/Cessation of labor signs/symptoms  2023 by Tatyana Fernandez RN  Outcome: Resolved/Met  2023 by Tatyana Fernandez RN  Outcome: Progressing Towards Goal  Goal: *Reassuring fetal surveillance  2023 by Tatyana Fernandez RN  Outcome: Resolved/Met  2023 by Tatyana Fernandez RN  Outcome: Progressing Towards Goal     Problem: Pain  Goal: *Control of Pain  2023 by Tatyana Fernandez RN  Outcome: Resolved/Met  2023 by Tatyana Fernandez RN  Outcome: Progressing Towards Goal  Goal: *PALLIATIVE CARE:  Alleviation of Pain  2023 by Tatyana Fernandez RN  Outcome: Resolved/Met  2023 by Tatyana Fernandez RN  Outcome: Progressing Towards Goal     Problem: Patient Education: Go to Patient Education Activity  Goal: Patient/Family Education  2023 by Tatyana Fernandez RN  Outcome: Resolved/Met  2023 by Tatyana Fernandez RN  Outcome: Progressing Towards Goal     Problem: Vaginal Delivery: Day of Delivery-Post delivery  Goal: Activity/Safety  2023 by Tatyana Fernandez RN  Outcome: Resolved/Met  2023 by Tatyana Fernandez RN  Outcome: Progressing Towards Goal  Goal: Consults, if ordered  2023 by Tatyana Fernandez RN  Outcome: Resolved/Met  2023 by Tatyana Fernandez RN  Outcome: Progressing Towards Goal  Goal: Nutrition/Diet  2023 by Tatyana Fernandez RN  Outcome: Resolved/Met  2023 by Tatyana Fernandez RN  Outcome: Progressing Towards Goal  Goal: Discharge Planning  2023 by Tatyana Fernandez RN  Outcome: Resolved/Met  2023 by Tatyana Fernandez RN  Outcome: Progressing Towards Goal  Goal: Medications  2023 by Tatyana Fernandez RN  Outcome: Resolved/Met  2023 by Tatyana Fernandez RN  Outcome: Progressing Towards Goal  Goal: Treatments/Interventions/Procedures  2023 by Tatyana Fernandez RN  Outcome: Resolved/Met  2023 0921 by Christina Montes, RN  Outcome: Progressing Towards Goal  Goal: *Vital signs within defined limits  1/5/2023 1921 by Cruz Mccartney ([place]), RN  Outcome: Resolved/Met  1/5/2023 0921 by Cruz Mccartney ([place]), RN  Outcome: Progressing Towards Goal  Goal: *Labs within defined limits  1/5/2023 1921 by Christina Montes, RN  Outcome: Resolved/Met  1/5/2023 0921 by Christina Montes, RN  Outcome: Progressing Towards Goal  Goal: *Hemodynamically stable  1/5/2023 1921 by Christina Montes, RN  Outcome: Resolved/Met  1/5/2023 0921 by Christina Montes, RN  Outcome: Progressing Towards Goal  Goal: *Optimal pain control at patient's stated goal  1/5/2023 1921 by Christina Montes, RN  Outcome: Resolved/Met  1/5/2023 0921 by Christina Montes, RN  Outcome: Progressing Towards Goal  Goal: *Participates in infant care  1/5/2023 1921 by Christina Montes, RN  Outcome: Resolved/Met  1/5/2023 0921 by Christina Montes, RN  Outcome: Progressing Towards Goal  Goal: *Demonstrates progressive activity  1/5/2023 1921 by Christina Montes, RN  Outcome: Resolved/Met  1/5/2023 0921 by Christina Montes, RN  Outcome: Progressing Towards Goal  Goal: *Tolerating diet  1/5/2023 1921 by Christina Montes, RN  Outcome: Resolved/Met  1/5/2023 0921 by Christnia Montes, RN  Outcome: Progressing Towards Goal

## 2024-03-03 ENCOUNTER — HOSPITAL ENCOUNTER (EMERGENCY)
Facility: HOSPITAL | Age: 24
Discharge: HOME | End: 2024-03-03
Attending: STUDENT IN AN ORGANIZED HEALTH CARE EDUCATION/TRAINING PROGRAM
Payer: MEDICAID

## 2024-03-03 VITALS
SYSTOLIC BLOOD PRESSURE: 135 MMHG | WEIGHT: 165 LBS | BODY MASS INDEX: 28.17 KG/M2 | DIASTOLIC BLOOD PRESSURE: 80 MMHG | OXYGEN SATURATION: 99 % | HEART RATE: 98 BPM | HEIGHT: 64 IN | TEMPERATURE: 99.1 F | RESPIRATION RATE: 19 BRPM

## 2024-03-03 DIAGNOSIS — J02.9 VIRAL PHARYNGITIS: Primary | ICD-10-CM

## 2024-03-03 PROCEDURE — 6360000002 HC RX W HCPCS: Performed by: STUDENT IN AN ORGANIZED HEALTH CARE EDUCATION/TRAINING PROGRAM

## 2024-03-03 PROCEDURE — 6370000000 HC RX 637 (ALT 250 FOR IP): Performed by: STUDENT IN AN ORGANIZED HEALTH CARE EDUCATION/TRAINING PROGRAM

## 2024-03-03 PROCEDURE — 99283 EMERGENCY DEPT VISIT LOW MDM: CPT

## 2024-03-03 RX ORDER — IBUPROFEN 600 MG/1
600 TABLET ORAL
Status: COMPLETED | OUTPATIENT
Start: 2024-03-03 | End: 2024-03-03

## 2024-03-03 RX ORDER — DEXAMETHASONE 4 MG/1
10 TABLET ORAL ONCE
Status: COMPLETED | OUTPATIENT
Start: 2024-03-03 | End: 2024-03-03

## 2024-03-03 RX ADMIN — IBUPROFEN 600 MG: 600 TABLET, FILM COATED ORAL at 05:09

## 2024-03-03 RX ADMIN — DEXAMETHASONE 10 MG: 4 TABLET ORAL at 05:08

## 2024-03-03 ASSESSMENT — PAIN SCALES - GENERAL: PAINLEVEL_OUTOF10: 8

## 2024-03-03 ASSESSMENT — PAIN DESCRIPTION - FREQUENCY: FREQUENCY: CONTINUOUS

## 2024-03-03 ASSESSMENT — LIFESTYLE VARIABLES
HOW MANY STANDARD DRINKS CONTAINING ALCOHOL DO YOU HAVE ON A TYPICAL DAY: PATIENT DOES NOT DRINK
HOW OFTEN DO YOU HAVE A DRINK CONTAINING ALCOHOL: NEVER

## 2024-03-03 ASSESSMENT — PAIN DESCRIPTION - DESCRIPTORS: DESCRIPTORS: THROBBING

## 2024-03-03 ASSESSMENT — PAIN DESCRIPTION - LOCATION: LOCATION: THROAT

## 2024-03-03 ASSESSMENT — PAIN DESCRIPTION - PAIN TYPE: TYPE: ACUTE PAIN

## 2024-03-03 ASSESSMENT — PAIN DESCRIPTION - ONSET: ONSET: AWAKENED FROM SLEEP

## 2024-03-03 NOTE — ED PROVIDER NOTES
(ADVIL;MOTRIN) 800 MG TABLET    Take 800 mg by mouth every 8 hours as needed       ALLERGIES     Patient has no known allergies.    FAMILY HISTORY     No family history on file.       SOCIAL HISTORY       Social History     Socioeconomic History    Marital status: Single   Tobacco Use    Smoking status: Never    Smokeless tobacco: Never   Substance and Sexual Activity    Alcohol use: No    Drug use: No       SCREENINGS         Bradford Coma Scale  Eye Opening: Spontaneous  Best Verbal Response: Oriented  Best Motor Response: Obeys commands  Tanner Coma Scale Score: 15                     CIWA Assessment  BP: 135/80  Pulse: 98                 PHYSICAL EXAM    (up to 7 for level 4, 8 or more for level 5)     ED Triage Vitals   BP Temp Temp src Pulse Respirations SpO2 Height Weight - Scale   03/03/24 0449 03/03/24 0448 -- 03/03/24 0448 03/03/24 0448 03/03/24 0448 03/03/24 0448 03/03/24 0448   135/80 99.1 °F (37.3 °C)  98 19 99 % 1.626 m (5' 4\") 74.8 kg (165 lb)       Physical Exam    General: No acute distress  Head: Normocephalic, atraumatic  Psych: Cooperative and alert  Eyes: No scleral icterus, normal conjunctiva  ENT: Moist oral mucosa, no significant rhinorrhea or sinus tenderness, tonsils are swollen but no significant erythema or exudate, no soft palate swelling, uvula hangs midline  Neck: Supple mild nontender anterior cervical lymphadenopathy  CV: Regular rate and rhythm  Pulm: Clear breath sounds bilaterally without any wheezing or rhonchi, normal respiratory rate  GI: Normal bowel sounds, soft, non-tender  MSK: Moves all four extremities  Skin: No rashes  Neuro: Alert and conversive      DIAGNOSTIC RESULTS     EKG: All EKG's are interpreted by the Emergency Department Physician who either signs or Co-signs this chart in the absence of a cardiologist.        RADIOLOGY:   Non-plain film images such as CT, Ultrasound and MRI are read by the radiologist. Plain radiographic images are visualized and

## 2024-03-03 NOTE — ED NOTES
Administered medication and educated patient on side effects. Patient allergies verified. All questions and concerns answered. Pt instructed to use call bell at the bedside if needed. This nurse will continues to monitor pt at this time. Side rails up.     Patient discharged at this time. This RN reviewed discharge instructions at this time with the patient.  patient verbalized understanding and does not have any questions. Pt ambulatory upon discharge, & in stable condition. Pt armband removed & shredded.

## 2024-03-03 NOTE — DISCHARGE INSTRUCTIONS
Recommend taking extra strength Tylenol every 4-6 hours and ibuprofen 600 mg every 6 hours as needed for pain and discomfort.    Recommend using sinus rinses and Afrin nasal spray to help with nasal congestion which should also help with sore throat and cough.  However any over-the-counter cold and flu medications are safe to use per your preference.    If your symptoms significantly worsen, you start having fevers greater than 100.4, or are truly unable to swallow and start drooling then please return to the emergency department.

## 2024-05-09 ENCOUNTER — APPOINTMENT (OUTPATIENT)
Facility: HOSPITAL | Age: 24
End: 2024-05-09
Payer: MEDICAID

## 2024-05-09 ENCOUNTER — HOSPITAL ENCOUNTER (EMERGENCY)
Facility: HOSPITAL | Age: 24
Discharge: HOME | End: 2024-05-09
Attending: EMERGENCY MEDICINE
Payer: MEDICAID

## 2024-05-09 VITALS
TEMPERATURE: 98.7 F | BODY MASS INDEX: 31.21 KG/M2 | HEIGHT: 64 IN | HEART RATE: 81 BPM | DIASTOLIC BLOOD PRESSURE: 87 MMHG | WEIGHT: 182.8 LBS | RESPIRATION RATE: 18 BRPM | SYSTOLIC BLOOD PRESSURE: 133 MMHG | OXYGEN SATURATION: 100 %

## 2024-05-09 DIAGNOSIS — S09.90XA INJURY OF HEAD, INITIAL ENCOUNTER: Primary | ICD-10-CM

## 2024-05-09 LAB
APPEARANCE UR: ABNORMAL
BACTERIA URNS QL MICRO: ABNORMAL /HPF
BILIRUB UR QL: NEGATIVE
COLOR UR: ABNORMAL
EPITH CASTS URNS QL MICRO: ABNORMAL /LPF (ref 0–5)
GLUCOSE UR STRIP.AUTO-MCNC: NEGATIVE MG/DL
HCG UR QL: NEGATIVE
HGB UR QL STRIP: ABNORMAL
KETONES UR QL STRIP.AUTO: NEGATIVE MG/DL
LEUKOCYTE ESTERASE UR QL STRIP.AUTO: ABNORMAL
NITRITE UR QL STRIP.AUTO: NEGATIVE
PH UR STRIP: 6.5 (ref 5–8)
PROT UR STRIP-MCNC: 100 MG/DL
RBC #/AREA URNS HPF: ABNORMAL /HPF (ref 0–5)
SP GR UR REFRACTOMETRY: 1.03 (ref 1–1.03)
UROBILINOGEN UR QL STRIP.AUTO: 1 EU/DL (ref 0.2–1)
WBC URNS QL MICRO: ABNORMAL /HPF (ref 0–4)

## 2024-05-09 PROCEDURE — 99284 EMERGENCY DEPT VISIT MOD MDM: CPT

## 2024-05-09 PROCEDURE — 72070 X-RAY EXAM THORAC SPINE 2VWS: CPT

## 2024-05-09 PROCEDURE — 6370000000 HC RX 637 (ALT 250 FOR IP): Performed by: EMERGENCY MEDICINE

## 2024-05-09 PROCEDURE — 81025 URINE PREGNANCY TEST: CPT

## 2024-05-09 PROCEDURE — 72100 X-RAY EXAM L-S SPINE 2/3 VWS: CPT

## 2024-05-09 PROCEDURE — 72125 CT NECK SPINE W/O DYE: CPT

## 2024-05-09 PROCEDURE — 70450 CT HEAD/BRAIN W/O DYE: CPT

## 2024-05-09 PROCEDURE — 81001 URINALYSIS AUTO W/SCOPE: CPT

## 2024-05-09 RX ORDER — ONDANSETRON 4 MG/1
4 TABLET, ORALLY DISINTEGRATING ORAL
Status: COMPLETED | OUTPATIENT
Start: 2024-05-09 | End: 2024-05-09

## 2024-05-09 RX ORDER — BUTALBITAL, ACETAMINOPHEN AND CAFFEINE 300; 40; 50 MG/1; MG/1; MG/1
1 CAPSULE ORAL EVERY 6 HOURS PRN
Qty: 20 CAPSULE | Refills: 0 | Status: SHIPPED | OUTPATIENT
Start: 2024-05-09

## 2024-05-09 RX ADMIN — ONDANSETRON 4 MG: 4 TABLET, ORALLY DISINTEGRATING ORAL at 02:51

## 2024-05-09 ASSESSMENT — PAIN SCALES - GENERAL: PAINLEVEL_OUTOF10: 10

## 2024-05-09 ASSESSMENT — PAIN DESCRIPTION - ONSET: ONSET: AWAKENED FROM SLEEP

## 2024-05-09 ASSESSMENT — PAIN - FUNCTIONAL ASSESSMENT
PAIN_FUNCTIONAL_ASSESSMENT: 0-10
PAIN_FUNCTIONAL_ASSESSMENT: ACTIVITIES ARE NOT PREVENTED

## 2024-05-09 ASSESSMENT — PAIN DESCRIPTION - FREQUENCY: FREQUENCY: CONTINUOUS

## 2024-05-09 ASSESSMENT — PAIN DESCRIPTION - DESCRIPTORS: DESCRIPTORS: ACHING;THROBBING

## 2024-05-09 ASSESSMENT — ENCOUNTER SYMPTOMS
GASTROINTESTINAL NEGATIVE: 1
RESPIRATORY NEGATIVE: 1

## 2024-05-09 ASSESSMENT — PAIN DESCRIPTION - LOCATION: LOCATION: HEAD

## 2024-05-09 ASSESSMENT — PAIN DESCRIPTION - PAIN TYPE: TYPE: ACUTE PAIN

## 2024-05-09 NOTE — ED PROVIDER NOTES
moderate  Management options: moderate  General comments: Differential Notes: Concussion, fractured skull, intracranial hemorrhage,    CT scan of head: Negative for cerebral edema or intracranial hemorrhage    Diagnosis: Contusion of head disposition: DC home with follow-up with PCP in 2 days.  Patient with treated with Fioricet for headaches        XR THORACIC SPINE (2 VIEWS)    (Results Pending)   XR LUMBAR SPINE (2-3 VIEWS)    (Results Pending)   CT HEAD WO CONTRAST    (Results Pending)   CT CERVICAL SPINE WO CONTRAST    (Results Pending)         2:47 AM  Upon re-evaluation the patient's symptoms have improved. Pt has non-toxic appearance and condition is stable for discharge. Patient was informed of tests & results, instructed to f/u with PCP and return to the ED upon worsening of symptoms. All questions and concerns were addressed.       Procedures    FINAL IMPRESSION      Contusion head      DISPOSITION/PLAN     DISPOSITION  D/C home    DISCHARGE MEDICATIONS:     Fioricet    PATIENT REFERRED TO:  PCP in 2 days.  Return to ER prn.      (Please note that portions of this note were completed with a voice recognitionprogram.  Efforts were made to edit the dictations but occasionally words are mis-transcribed.)    Nirmal Guo MD(electronically signed)  Attending Emergency Physician          Nirmal Guo MD  05/09/24 [id number]

## 2024-05-09 NOTE — ED TRIAGE NOTES
Pt c/o headache, fall, and middle back pain since 4 pm today.    Pt stated \"that she was coming down some stairs when she fell and hit her head on the wall with some nausea\".    Pt denies losing     Past Medical History:   Diagnosis Date    Anemia     Rhesus isoimmunization affecting pregnancy      Pt ambulated to the room with a steady gait.

## 2024-05-14 NOTE — ANESTHESIA PREPROCEDURE EVALUATION
Relevant Problems   No relevant active problems       Anesthetic History               Review of Systems / Medical History  Patient summary reviewed, nursing notes reviewed and pertinent labs reviewed    Pulmonary  Within defined limits                 Neuro/Psych   Within defined limits           Cardiovascular  Within defined limits                Exercise tolerance: >4 METS     GI/Hepatic/Renal  Within defined limits              Endo/Other  Within defined limits           Other Findings              Physical Exam    Airway  Mallampati: II  TM Distance: 4 - 6 cm  Neck ROM: normal range of motion   Mouth opening: Normal     Cardiovascular               Dental  No notable dental hx       Pulmonary                 Abdominal  GI exam deferred       Other Findings            Anesthetic Plan    ASA: 2  Anesthesia type: epidural            Anesthetic plan and risks discussed with: Patient Dr Hull

## 2024-07-22 ENCOUNTER — HOSPITAL ENCOUNTER (EMERGENCY)
Facility: HOSPITAL | Age: 24
Discharge: HOME | End: 2024-07-22
Attending: EMERGENCY MEDICINE
Payer: MEDICAID

## 2024-07-22 ENCOUNTER — APPOINTMENT (OUTPATIENT)
Facility: HOSPITAL | Age: 24
End: 2024-07-22
Payer: MEDICAID

## 2024-07-22 VITALS
DIASTOLIC BLOOD PRESSURE: 90 MMHG | WEIGHT: 182 LBS | BODY MASS INDEX: 31.07 KG/M2 | HEART RATE: 93 BPM | SYSTOLIC BLOOD PRESSURE: 133 MMHG | HEIGHT: 64 IN | RESPIRATION RATE: 18 BRPM | OXYGEN SATURATION: 99 % | TEMPERATURE: 98.6 F

## 2024-07-22 DIAGNOSIS — S93.402A SPRAIN OF LEFT ANKLE, UNSPECIFIED LIGAMENT, INITIAL ENCOUNTER: Primary | ICD-10-CM

## 2024-07-22 PROCEDURE — 73630 X-RAY EXAM OF FOOT: CPT

## 2024-07-22 PROCEDURE — 72110 X-RAY EXAM L-2 SPINE 4/>VWS: CPT

## 2024-07-22 PROCEDURE — 6370000000 HC RX 637 (ALT 250 FOR IP): Performed by: EMERGENCY MEDICINE

## 2024-07-22 PROCEDURE — 99283 EMERGENCY DEPT VISIT LOW MDM: CPT

## 2024-07-22 PROCEDURE — 73610 X-RAY EXAM OF ANKLE: CPT

## 2024-07-22 PROCEDURE — 72050 X-RAY EXAM NECK SPINE 4/5VWS: CPT

## 2024-07-22 RX ORDER — NORELGESTROMIN AND ETHINYL ESTRADIOL 150; 35 UG/D; UG/D
PATCH TRANSDERMAL
COMMUNITY
Start: 2024-07-17

## 2024-07-22 RX ORDER — IBUPROFEN 800 MG/1
800 TABLET ORAL EVERY 8 HOURS
Qty: 9 TABLET | Refills: 0 | Status: SHIPPED | OUTPATIENT
Start: 2024-07-22 | End: 2024-07-25

## 2024-07-22 RX ORDER — ACETAMINOPHEN 500 MG
1000 TABLET ORAL
Status: COMPLETED | OUTPATIENT
Start: 2024-07-22 | End: 2024-07-22

## 2024-07-22 RX ADMIN — ACETAMINOPHEN 1000 MG: 500 TABLET ORAL at 01:32

## 2024-07-22 NOTE — ED TRIAGE NOTES
Aox4 with a cc of neck, back and ankle pain post falling down 4 steps. Denies losing conciousness. She says she struck her head. Fall 10 minutes prior to arriving to the ED.

## 2024-07-22 NOTE — ED PROVIDER NOTES
EMERGENCY DEPARTMENT HISTORY AND PHYSICAL EXAM    1:05 AM EDT seen at this time and QB        Date: 2024  Patient Name: Victorina Reno    History of Presenting Illness     Chief Complaint   Patient presents with    Ankle Pain    Back Pain    Neck Pain         History Provided By: patient    Additional History (Context): Victorina Reno is a 24 y.o. female presents with patient had a fall she was trying to turn on the stairs and her foot went out from under her and landed on her back, chief his complaint is left ankle and foot pain along the lateral aspect.  Also has lower lumbar pain and some neck pain.  No focal neurologic deficit or contributory medical history.    PCP: Grace Coon MD    Chief Complaint:   Duration:    Timing:    Location:   Quality:   Severity:   Modifying Factors:   Associated Symptoms:       No current facility-administered medications for this encounter.     Current Outpatient Medications   Medication Sig Dispense Refill    XULANE 150-35 MCG/24HR       ibuprofen (ADVIL;MOTRIN) 800 MG tablet Take 1 tablet by mouth every 8 (eight) hours for 3 days 9 tablet 0    butalbital-APAP-caffeine -40 MG CAPS per capsule Take 1 capsule by mouth every 6 hours as needed for Headaches 20 capsule 0    ferrous sulfate (IRON 325) 325 (65 Fe) MG tablet Take by mouth every morning (before breakfast)         Past History     Past Medical History:  Past Medical History:   Diagnosis Date    Anemia     Rhesus isoimmunization affecting pregnancy        Past Surgical History:  Past Surgical History:   Procedure Laterality Date     SECTION       SECTION      17, 2022       Family History:  No family history on file.    Social History:  Social History     Tobacco Use    Smoking status: Never    Smokeless tobacco: Never   Substance Use Topics    Alcohol use: No    Drug use: No       Allergies:  No Known Allergies      Review of Systems     Review of Systems      Physical Exam

## 2024-12-20 ENCOUNTER — HOSPITAL ENCOUNTER (EMERGENCY)
Facility: HOSPITAL | Age: 24
Discharge: HOME | End: 2024-12-20
Attending: EMERGENCY MEDICINE
Payer: MEDICAID

## 2024-12-20 ENCOUNTER — APPOINTMENT (OUTPATIENT)
Facility: HOSPITAL | Age: 24
End: 2024-12-20
Attending: EMERGENCY MEDICINE
Payer: MEDICAID

## 2024-12-20 VITALS
RESPIRATION RATE: 20 BRPM | OXYGEN SATURATION: 99 % | SYSTOLIC BLOOD PRESSURE: 117 MMHG | WEIGHT: 182 LBS | BODY MASS INDEX: 31.07 KG/M2 | DIASTOLIC BLOOD PRESSURE: 79 MMHG | TEMPERATURE: 97.8 F | HEART RATE: 95 BPM | HEIGHT: 64 IN

## 2024-12-20 DIAGNOSIS — B96.89 BV (BACTERIAL VAGINOSIS): ICD-10-CM

## 2024-12-20 DIAGNOSIS — N76.0 BV (BACTERIAL VAGINOSIS): ICD-10-CM

## 2024-12-20 DIAGNOSIS — R10.9 ABDOMINAL PAIN AFFECTING PREGNANCY: Primary | ICD-10-CM

## 2024-12-20 DIAGNOSIS — O26.899 ABDOMINAL PAIN AFFECTING PREGNANCY: Primary | ICD-10-CM

## 2024-12-20 LAB
ALBUMIN SERPL-MCNC: 3.1 G/DL (ref 3.4–5)
ALBUMIN/GLOB SERPL: 0.7 (ref 0.8–1.7)
ALP SERPL-CCNC: 69 U/L (ref 45–117)
ALT SERPL-CCNC: 14 U/L (ref 13–56)
ANION GAP SERPL CALC-SCNC: 5 MMOL/L (ref 3–18)
APPEARANCE UR: ABNORMAL
AST SERPL-CCNC: 14 U/L (ref 10–38)
BACTERIA URNS QL MICRO: NEGATIVE /HPF
BASOPHILS # BLD: 0 K/UL (ref 0–0.1)
BASOPHILS NFR BLD: 1 % (ref 0–2)
BILIRUB SERPL-MCNC: 0.3 MG/DL (ref 0.2–1)
BILIRUB UR QL: NEGATIVE
BUN SERPL-MCNC: 9 MG/DL (ref 7–18)
BUN/CREAT SERPL: 13 (ref 12–20)
CALCIUM SERPL-MCNC: 8.6 MG/DL (ref 8.5–10.1)
CHLORIDE SERPL-SCNC: 105 MMOL/L (ref 100–111)
CO2 SERPL-SCNC: 25 MMOL/L (ref 21–32)
COLOR UR: YELLOW
CREAT SERPL-MCNC: 0.68 MG/DL (ref 0.6–1.3)
DIFFERENTIAL METHOD BLD: ABNORMAL
EOSINOPHIL # BLD: 0.1 K/UL (ref 0–0.4)
EOSINOPHIL NFR BLD: 1 % (ref 0–5)
EPITH CASTS URNS QL MICRO: ABNORMAL /LPF (ref 0–5)
ERYTHROCYTE [DISTWIDTH] IN BLOOD BY AUTOMATED COUNT: 13 % (ref 11.6–14.5)
GLOBULIN SER CALC-MCNC: 4.4 G/DL (ref 2–4)
GLUCOSE SERPL-MCNC: 83 MG/DL (ref 74–99)
GLUCOSE UR STRIP.AUTO-MCNC: NEGATIVE MG/DL
HCG SERPL-ACNC: ABNORMAL MIU/ML (ref 0–10)
HCT VFR BLD AUTO: 31.3 % (ref 35–45)
HGB BLD-MCNC: 10.7 G/DL (ref 12–16)
HGB UR QL STRIP: NEGATIVE
IMM GRANULOCYTES # BLD AUTO: 0 K/UL (ref 0–0.04)
IMM GRANULOCYTES NFR BLD AUTO: 0 % (ref 0–0.5)
KETONES UR QL STRIP.AUTO: ABNORMAL MG/DL
LEUKOCYTE ESTERASE UR QL STRIP.AUTO: ABNORMAL
LIPASE SERPL-CCNC: 23 U/L (ref 13–75)
LYMPHOCYTES # BLD: 2 K/UL (ref 0.9–3.6)
LYMPHOCYTES NFR BLD: 23 % (ref 21–52)
MCH RBC QN AUTO: 29.2 PG (ref 24–34)
MCHC RBC AUTO-ENTMCNC: 34.2 G/DL (ref 31–37)
MCV RBC AUTO: 85.5 FL (ref 78–100)
MONOCYTES # BLD: 0.7 K/UL (ref 0.05–1.2)
MONOCYTES NFR BLD: 8 % (ref 3–10)
MUCOUS THREADS URNS QL MICRO: ABNORMAL /LPF
NEUTS SEG # BLD: 5.7 K/UL (ref 1.8–8)
NEUTS SEG NFR BLD: 67 % (ref 40–73)
NITRITE UR QL STRIP.AUTO: NEGATIVE
NRBC # BLD: 0 K/UL (ref 0–0.01)
NRBC BLD-RTO: 0 PER 100 WBC
PH UR STRIP: 6 (ref 5–8)
PLATELET # BLD AUTO: 303 K/UL (ref 135–420)
PMV BLD AUTO: 8.8 FL (ref 9.2–11.8)
POTASSIUM SERPL-SCNC: 3.6 MMOL/L (ref 3.5–5.5)
PROT SERPL-MCNC: 7.5 G/DL (ref 6.4–8.2)
PROT UR STRIP-MCNC: 30 MG/DL
RBC # BLD AUTO: 3.66 M/UL (ref 4.2–5.3)
RBC #/AREA URNS HPF: NEGATIVE /HPF (ref 0–5)
SERVICE CMNT-IMP: NORMAL
SODIUM SERPL-SCNC: 135 MMOL/L (ref 136–145)
SP GR UR REFRACTOMETRY: >1.03 (ref 1–1.03)
UROBILINOGEN UR QL STRIP.AUTO: 1 EU/DL (ref 0.2–1)
WBC # BLD AUTO: 8.5 K/UL (ref 4.6–13.2)
WBC URNS QL MICRO: ABNORMAL /HPF (ref 0–4)
WET PREP GENITAL: NORMAL

## 2024-12-20 PROCEDURE — 83690 ASSAY OF LIPASE: CPT

## 2024-12-20 PROCEDURE — 81001 URINALYSIS AUTO W/SCOPE: CPT

## 2024-12-20 PROCEDURE — 85025 COMPLETE CBC W/AUTO DIFF WBC: CPT

## 2024-12-20 PROCEDURE — 99284 EMERGENCY DEPT VISIT MOD MDM: CPT

## 2024-12-20 PROCEDURE — 87210 SMEAR WET MOUNT SALINE/INK: CPT

## 2024-12-20 PROCEDURE — 84702 CHORIONIC GONADOTROPIN TEST: CPT

## 2024-12-20 PROCEDURE — 76801 OB US < 14 WKS SINGLE FETUS: CPT

## 2024-12-20 PROCEDURE — 87491 CHLMYD TRACH DNA AMP PROBE: CPT

## 2024-12-20 PROCEDURE — 80053 COMPREHEN METABOLIC PANEL: CPT

## 2024-12-20 PROCEDURE — 87591 N.GONORRHOEAE DNA AMP PROB: CPT

## 2024-12-20 RX ORDER — METRONIDAZOLE 500 MG/1
500 TABLET ORAL 2 TIMES DAILY
Qty: 14 TABLET | Refills: 0 | Status: SHIPPED | OUTPATIENT
Start: 2024-12-20 | End: 2024-12-27

## 2024-12-20 ASSESSMENT — PAIN - FUNCTIONAL ASSESSMENT: PAIN_FUNCTIONAL_ASSESSMENT: 0-10

## 2024-12-20 NOTE — ED PROVIDER NOTES
[facility] EMERGENCY DEPT  EMERGENCY DEPARTMENT ENCOUNTER      Pt Name: Victorina Reno  MRN: [id number]  Birthdate 2000  Date of evaluation: 2024  Provider: PRASANTH CONNOR MD  3:52 PM    CHIEF COMPLAINT       Chief Complaint   Patient presents with    Abdominal Pain         HISTORY OF PRESENT ILLNESS    Victorina Reno is a 24 y.o. female who presents to the emergency department     24-year-old female past medical history of anemia presents emergency department with left lower quadrant abdominal pain for the past few days.  Patient states that she is pregnant 7 weeks.  No reported trauma.  No vaginal discharge no vaginal bleeding no other issues.    The history is provided by medical records and the patient. No  was used.       Nursing Notes were reviewed.    REVIEW OF SYSTEMS       Review of Systems    Except as noted above the remainder of the review of systems was reviewed and negative.       PAST MEDICAL HISTORY     Past Medical History:   Diagnosis Date    Anemia     Rhesus isoimmunization affecting pregnancy          SURGICAL HISTORY       Past Surgical History:   Procedure Laterality Date     SECTION       SECTION      17, 2022         CURRENT MEDICATIONS       Previous Medications    BUTALBITAL-APAP-CAFFEINE -40 MG CAPS PER CAPSULE    Take 1 capsule by mouth every 6 hours as needed for Headaches    FERROUS SULFATE (IRON 325) 325 (65 FE) MG TABLET    Take by mouth every morning (before breakfast)    IBUPROFEN (ADVIL;MOTRIN) 800 MG TABLET    Take 1 tablet by mouth every 8 (eight) hours for 3 days    XULANE 150-35 MCG/24HR           ALLERGIES     Patient has no known allergies.    FAMILY HISTORY     History reviewed. No pertinent family history.       SOCIAL HISTORY       Social History     Socioeconomic History    Marital status: Single     Spouse name: None    Number of children: None    Years of education: None    Highest education level: None   Tobacco Use

## 2024-12-20 NOTE — ED TRIAGE NOTES
Pt c/o LLQ pain that is sharp. Started this morning. Denies n,v, d. Pt states she is 7 weeks pregnant. G-4 P-3
